# Patient Record
Sex: MALE | Race: WHITE | NOT HISPANIC OR LATINO | Employment: FULL TIME | URBAN - NONMETROPOLITAN AREA
[De-identification: names, ages, dates, MRNs, and addresses within clinical notes are randomized per-mention and may not be internally consistent; named-entity substitution may affect disease eponyms.]

---

## 2017-06-06 ENCOUNTER — TELEPHONE (OUTPATIENT)
Dept: FAMILY MEDICINE CLINIC | Facility: CLINIC | Age: 67
End: 2017-06-06

## 2017-06-12 RX ORDER — TADALAFIL 10 MG/1
10 TABLET ORAL DAILY PRN
Qty: 5 TABLET | Refills: 0 | Status: SHIPPED | OUTPATIENT
Start: 2017-06-12 | End: 2017-07-06 | Stop reason: SDUPTHER

## 2017-07-06 RX ORDER — TADALAFIL 10 MG/1
10 TABLET ORAL DAILY PRN
Qty: 5 TABLET | Refills: 0 | Status: SHIPPED | OUTPATIENT
Start: 2017-07-06 | End: 2018-08-29

## 2017-07-06 NOTE — TELEPHONE ENCOUNTER
Patient did not  Rx at [facility] in June does not use that pharmacy any longer,verified with pharmacy ,resent Cialis to [facility] as requested.

## 2017-07-10 ENCOUNTER — TELEPHONE (OUTPATIENT)
Dept: FAMILY MEDICINE CLINIC | Facility: CLINIC | Age: 67
End: 2017-07-10

## 2017-07-10 DIAGNOSIS — N52.9 ERECTILE DYSFUNCTION, UNSPECIFIED ERECTILE DYSFUNCTION TYPE: Primary | ICD-10-CM

## 2017-07-10 NOTE — TELEPHONE ENCOUNTER
Yes ok to try      After speaking with the provider she deceided to send to urology & patient was notified.

## 2017-07-10 NOTE — TELEPHONE ENCOUNTER
Patient called reports Cialis is too expensive $150.00 for 5 pills but the pharmacist told him that Revatio 20mg would only be $20.00 after his insurance paid & he wants to know if he can have this to try?

## 2017-07-31 ENCOUNTER — OFFICE VISIT (OUTPATIENT)
Dept: UROLOGY | Facility: CLINIC | Age: 67
End: 2017-07-31

## 2017-07-31 DIAGNOSIS — R79.89 LOW TESTOSTERONE: Primary | ICD-10-CM

## 2017-07-31 DIAGNOSIS — N52.02 CORPORO-VENOUS OCCLUSIVE ERECTILE DYSFUNCTION: ICD-10-CM

## 2017-07-31 LAB
BASOPHILS # BLD AUTO: 0.07 10*3/MM3 (ref 0–0.3)
BASOPHILS NFR BLD AUTO: 1.1 % (ref 0–2)
DEPRECATED RDW RBC AUTO: 39.5 FL (ref 37–54)
EOSINOPHIL # BLD AUTO: 0.19 10*3/MM3 (ref 0–0.7)
EOSINOPHIL NFR BLD AUTO: 3 % (ref 0–5)
ERYTHROCYTE [DISTWIDTH] IN BLOOD BY AUTOMATED COUNT: 12.3 % (ref 11.5–14.5)
HCT VFR BLD AUTO: 38.6 % (ref 42–52)
HGB BLD-MCNC: 12.7 G/DL (ref 14–18)
IMM GRANULOCYTES # BLD: 0.01 10*3/MM3 (ref 0–0.03)
IMM GRANULOCYTES NFR BLD: 0.2 % (ref 0–0.5)
LYMPHOCYTES # BLD AUTO: 2.91 10*3/MM3 (ref 1–3)
LYMPHOCYTES NFR BLD AUTO: 46 % (ref 21–51)
MCH RBC QN AUTO: 29.5 PG (ref 27–33)
MCHC RBC AUTO-ENTMCNC: 32.9 G/DL (ref 33–37)
MCV RBC AUTO: 89.8 FL (ref 80–94)
MONOCYTES # BLD AUTO: 0.68 10*3/MM3 (ref 0.1–0.9)
MONOCYTES NFR BLD AUTO: 10.7 % (ref 0–10)
NEUTROPHILS # BLD AUTO: 2.47 10*3/MM3 (ref 1.4–6.5)
NEUTROPHILS NFR BLD AUTO: 39 % (ref 30–70)
PLATELET # BLD AUTO: 233 10*3/MM3 (ref 130–400)
PMV BLD AUTO: 11.1 FL (ref 6–10)
PSA SERPL-MCNC: 0.12 NG/ML (ref 0–4)
RBC # BLD AUTO: 4.3 10*6/MM3 (ref 4.7–6.1)
TESTOST SERPL-MCNC: 172.46 NG/DL (ref 86.98–780.1)
WBC NRBC COR # BLD: 6.33 10*3/MM3 (ref 4.5–12.5)

## 2017-07-31 PROCEDURE — 36415 COLL VENOUS BLD VENIPUNCTURE: CPT | Performed by: UROLOGY

## 2017-07-31 PROCEDURE — 84153 ASSAY OF PSA TOTAL: CPT | Performed by: UROLOGY

## 2017-07-31 PROCEDURE — 99213 OFFICE O/P EST LOW 20 MIN: CPT | Performed by: UROLOGY

## 2017-07-31 PROCEDURE — 96372 THER/PROPH/DIAG INJ SC/IM: CPT | Performed by: UROLOGY

## 2017-07-31 PROCEDURE — 84403 ASSAY OF TOTAL TESTOSTERONE: CPT | Performed by: UROLOGY

## 2017-07-31 PROCEDURE — 85025 COMPLETE CBC W/AUTO DIFF WBC: CPT | Performed by: UROLOGY

## 2017-07-31 RX ORDER — SILDENAFIL CITRATE 20 MG/1
TABLET ORAL
Qty: 24 TABLET | Refills: 11 | Status: SHIPPED | OUTPATIENT
Start: 2017-07-31 | End: 2017-07-31 | Stop reason: SDUPTHER

## 2017-07-31 RX ORDER — SILDENAFIL CITRATE 20 MG/1
TABLET ORAL
Qty: 24 TABLET | Refills: 11 | Status: SHIPPED | OUTPATIENT
Start: 2017-07-31 | End: 2018-08-29

## 2017-07-31 RX ORDER — TESTOSTERONE CYPIONATE 200 MG/ML
400 INJECTION, SOLUTION INTRAMUSCULAR ONCE
Status: COMPLETED | OUTPATIENT
Start: 2017-07-31 | End: 2017-07-31

## 2017-07-31 RX ADMIN — TESTOSTERONE CYPIONATE 400 MG: 200 INJECTION, SOLUTION INTRAMUSCULAR at 15:54

## 2017-07-31 NOTE — PROGRESS NOTES
Chief Complaint:          Chief Complaint   Patient presents with   • Erectile Dysfunction       HPI:   66 y.o. male.    66-year-old white male who carries the diagnosis of mumps orchitis had been on ask her on testosterone replacement therapy since 2013 but he stopped because of the severe reaction.  He also like to Viagra to use on a prn but he couldn't afford the excessive price tag.  He currently has a positive Art questionnaire with fatigue and low libido difficulty achieving and maintaining an erection etc.  His etiology for low testosterone is clearly a consequence of mumps orchitis that he had post pubertal E.  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  I'm going to re-institute therapy.  I'm going to check appropriate labs and start him on injectable therapy after levels today and see him back in 2 weeks teach him how to do it I started him on generic Viagra today.This pleasant male patient presents today with signs and symptoms that are consistent with low testosterone he has positive Art questionnaire by history.  He has a discussion of the various forms testosterone available including parenteral, topical, and the form of a patch.  We discussed the efficacy of the gels, and the injections.  As well as the cost and benefits analysis.  We discussed the the studies a talked about heart disease and its effect on prostate cancer both of which are negligible.  He gives verbal consent to proceed with treatment.  He understands the risks and benefits of length he also completed his attempts at fertility he understands the partial effect on spermatogenesis.ED-we discussed the anatomy and physiology of the penis and the endothelium.  We discussed the various treatment options available including oral medication and its various forms.  Penile injections, vacuum erection device and surgical intervention reserved for only the most severe cases.  We  discussed the need for testosterone and about 20% of cases of erectile dysfunction  Past Medical History:        Past Medical History:   Diagnosis Date   • Arthritis    • Dyslipidemia    • Fatigue    • Hyperlipidemia    • Lower back pain    • Lumbar strain    • Myalgia    • Rib pain on right side    • Rotator cuff injury 1993    bucked of horse         Current Meds:     Current Outpatient Prescriptions   Medication Sig Dispense Refill   • gabapentin (NEURONTIN) 800 MG tablet Take 1 tablet by mouth Daily. 30 tablet 5   • methocarbamol (ROBAXIN) 750 MG tablet Take 1 tablet by mouth 2 (Two) Times a Day. 60 tablet 5   • naproxen (NAPROSYN) 500 MG tablet Take 1 tablet by mouth 2 (Two) Times a Day With Meals. 60 tablet 5   • tadalafil (CIALIS) 10 MG tablet Take 1 tablet by mouth Daily As Needed for erectile dysfunction. 5 tablet 0     No current facility-administered medications for this visit.         Allergies:      Allergies   Allergen Reactions   • Sulfa Antibiotics         Past Surgical History:     No past surgical history on file.      Social History:     Social History     Social History   • Marital status:      Spouse name: N/A   • Number of children: N/A   • Years of education: N/A     Occupational History   •       Social History Main Topics   • Smoking status: Never Smoker   • Smokeless tobacco: Current User     Types: Snuff   • Alcohol use No   • Drug use: Not on file   • Sexual activity: Not on file     Other Topics Concern   • Not on file     Social History Narrative       Family History:     Family History   Problem Relation Age of Onset   • Family history unknown: Yes       Review of Systems:     Review of Systems   Constitutional: Negative.    HENT: Negative.    Eyes: Negative.    Respiratory: Negative.    Cardiovascular: Negative.    Gastrointestinal: Negative.    Endocrine: Negative.    Musculoskeletal: Negative.    Allergic/Immunologic: Negative.    Neurological: Negative.     Hematological: Negative.    Psychiatric/Behavioral: Negative.        Physical Exam:     Physical Exam   Constitutional: He is oriented to person, place, and time. He appears well-developed and well-nourished.   HENT:   Head: Normocephalic and atraumatic.   Eyes: Conjunctivae and EOM are normal. Pupils are equal, round, and reactive to light.   Neck: Normal range of motion.   Cardiovascular: Normal rate, regular rhythm, normal heart sounds and intact distal pulses.    Pulmonary/Chest: Effort normal and breath sounds normal.   Abdominal: Soft. Bowel sounds are normal.   Genitourinary: Rectum normal, prostate normal and penis normal.   Genitourinary Comments: Uncircumcised normal phallus bilaterally descended severely atrophic testis as a consequence of mumps orchitis.  Small smooth firm prostate   Musculoskeletal: Normal range of motion.   Neurological: He is alert and oriented to person, place, and time. He has normal reflexes.   Skin: Skin is warm and dry.   Psychiatric: He has a normal mood and affect. His behavior is normal. Judgment and thought content normal.   Nursing note and vitals reviewed.      Procedure:       Assessment:   No diagnosis found.  No orders of the defined types were placed in this encounter.      Plan:   **Chris as a consequence of mumps orchitis am going to institute therapy today after appropriate laboratory parameters including safety laboratory parameters.This pleasant male patient presents today with signs and symptoms that are consistent with low testosterone he has positive Art questionnaire by history.  He has a discussion of the various forms testosterone available including parenteral, topical, and the form of a patch.  We discussed the efficacy of the gels, and the injections.  As well as the cost and benefits analysis.  We discussed the the studies a talked about heart disease and its effect on prostate cancer both of which are negligible.  He gives verbal consent to proceed  with treatment.  He understands the risks and benefits of length he also completed his attempts at fertility he understands the partial effect on spermatogenesis  Erectile dysfunction I'm going to institute Viagra therapy with generic treatment          This document has been electronically signed by OVIDIO ESTEVEZ MD July 31, 2017 3:07 PM

## 2017-08-21 ENCOUNTER — OFFICE VISIT (OUTPATIENT)
Dept: UROLOGY | Facility: CLINIC | Age: 67
End: 2017-08-21

## 2017-08-21 VITALS
HEIGHT: 71 IN | WEIGHT: 218 LBS | BODY MASS INDEX: 30.52 KG/M2 | HEART RATE: 51 BPM | DIASTOLIC BLOOD PRESSURE: 72 MMHG | SYSTOLIC BLOOD PRESSURE: 120 MMHG

## 2017-08-21 DIAGNOSIS — N52.03 COMBINED ARTERIAL INSUFFICIENCY AND CORPORO-VENOUS OCCLUSIVE ERECTILE DYSFUNCTION: Primary | ICD-10-CM

## 2017-08-21 PROCEDURE — 96372 THER/PROPH/DIAG INJ SC/IM: CPT | Performed by: UROLOGY

## 2017-08-21 PROCEDURE — 99214 OFFICE O/P EST MOD 30 MIN: CPT | Performed by: UROLOGY

## 2017-08-21 RX ORDER — TESTOSTERONE CYPIONATE 200 MG/ML
100 INJECTION, SOLUTION INTRAMUSCULAR ONCE
Status: COMPLETED | OUTPATIENT
Start: 2017-08-21 | End: 2017-08-21

## 2017-08-21 RX ORDER — TESTOSTERONE CYPIONATE 200 MG/ML
INJECTION, SOLUTION INTRAMUSCULAR
Qty: 10 ML | Refills: 2 | Status: SHIPPED | OUTPATIENT
Start: 2017-08-21 | End: 2017-10-16 | Stop reason: SDUPTHER

## 2017-08-21 RX ORDER — SILDENAFIL CITRATE 20 MG/1
TABLET ORAL
Qty: 24 TABLET | Refills: 11 | Status: SHIPPED | OUTPATIENT
Start: 2017-08-21 | End: 2018-08-29

## 2017-08-21 RX ADMIN — TESTOSTERONE CYPIONATE 100 MG: 200 INJECTION, SOLUTION INTRAMUSCULAR at 08:19

## 2017-08-21 NOTE — PROGRESS NOTES
"Chief Complaint:          Chief Complaint   Patient presents with   • Erectile Dysfunction     2 week f/u       HPI:   66 y.o. male.  66-year-old white male with erectile dysfunction.  He received the testosterone shot with dramatic results wants to continue the therapy himself.  His PSA was 0.120.  His testosterone level was low at 172.46.  He is a 20 mg of sildenafil very successfully.  We discussed options and places to fill the Rx.  Currently he is completely free of HARRISON-androgen deficiency in the age male questionnaire  The patient was queried regarding the androgen deficiency in the age male questionnaire.  This is a validated questionnaire that was performed onset of 314 [place] male physicians when it was positive it correlated directly with a 94% chance of low testosterone.  Patient indicates there is a decrease in libido or sex drive, a lack of energy, I decreased his strength and endurance, a decreased \"enjoyment of life\", sad and grumpy feelings with significant difficulty maintaining erections.  He is also been a recent deterioration regarding work performance.    understands that while he is a candidate for topical therapy if he is in contact with children this is not an option because it's been shown to accentuate genitalia development at an early age that this frequently irreversible.  He also understands this is a controlled substance and as such will not be prescribed without appropriate follow-up and appropriate laboratory investigation.  He understands effects on spermatogenesis including the fact that this is not always completely reversible and not always completely limited his ability to father a child.  He has demonstrated facility in the technique of both intramuscular and subcutaneous injection.  And has been taught sterility in drawing up the medication.  ED-we discussed the anatomy and physiology of the penis and the endothelium.  We discussed the various treatment options available " including oral medication and its various forms.  Penile injections, vacuum erection device and surgical intervention reserved for only the most severe cases.  We discussed the need for testosterone and about 20% of cases of erectile dysfunction he was given a prescription for sildenafil as well        Past Medical History:        Past Medical History:   Diagnosis Date   • Arthritis    • Dyslipidemia    • Fatigue    • Hyperlipidemia    • Lower back pain    • Lumbar strain    • Myalgia    • Rib pain on right side    • Rotator cuff injury 1993    bucked of horse         Current Meds:     Current Outpatient Prescriptions   Medication Sig Dispense Refill   • gabapentin (NEURONTIN) 800 MG tablet Take 1 tablet by mouth Daily. 30 tablet 5   • methocarbamol (ROBAXIN) 750 MG tablet Take 1 tablet by mouth 2 (Two) Times a Day. 60 tablet 5   • naproxen (NAPROSYN) 500 MG tablet Take 1 tablet by mouth 2 (Two) Times a Day With Meals. 60 tablet 5   • sildenafil (REVATIO) 20 MG tablet 1-5 by mouth one hour prior to intercourse on an empty stomach 24 tablet 11   • tadalafil (CIALIS) 10 MG tablet Take 1 tablet by mouth Daily As Needed for erectile dysfunction. 5 tablet 0     No current facility-administered medications for this visit.         Allergies:      Allergies   Allergen Reactions   • Sulfa Antibiotics         Past Surgical History:     History reviewed. No pertinent surgical history.      Social History:     Social History     Social History   • Marital status:      Spouse name: N/A   • Number of children: N/A   • Years of education: N/A     Occupational History   •       Social History Main Topics   • Smoking status: Never Smoker   • Smokeless tobacco: Current User     Types: Snuff   • Alcohol use No   • Drug use: Not on file   • Sexual activity: Not on file     Other Topics Concern   • Not on file     Social History Narrative       Family History:     Family History   Problem Relation Age of Onset   •  Family history unknown: Yes       Review of Systems:     Review of Systems   Constitutional: Negative.    HENT: Negative.    Eyes: Negative.    Respiratory: Negative.    Cardiovascular: Negative.    Gastrointestinal: Negative.    Endocrine: Negative.    Musculoskeletal: Negative.    Allergic/Immunologic: Negative.    Neurological: Negative.    Hematological: Negative.    Psychiatric/Behavioral: Negative.        Physical Exam:     Physical Exam   Constitutional: He is oriented to person, place, and time. He appears well-developed and well-nourished.   HENT:   Head: Normocephalic and atraumatic.   Eyes: Conjunctivae and EOM are normal. Pupils are equal, round, and reactive to light.   Neck: Normal range of motion.   Cardiovascular: Normal rate, regular rhythm, normal heart sounds and intact distal pulses.    Pulmonary/Chest: Effort normal and breath sounds normal.   Abdominal: Soft. Bowel sounds are normal.   Genitourinary: Rectum normal, prostate normal and penis normal.   Musculoskeletal: Normal range of motion.   Neurological: He is alert and oriented to person, place, and time. He has normal reflexes.   Skin: Skin is warm and dry.   Psychiatric: He has a normal mood and affect. His behavior is normal. Judgment and thought content normal.   Nursing note and vitals reviewed.      Procedure:       Assessment:   No diagnosis found.  No orders of the defined types were placed in this encounter.      Plan:   Low Testosterone-he will be started on testosterone replacement therapy today.  He was given the injections taught technique of injection using sterile technique.  Follow-up with me in 8 weeks and we'll check appropriate laboratory parameters at that time.\  Erectile dysfunction-ring dramatically better with testosterone incident sildenafil.  I'll follow back up with him in 8 weeks        This document has been electronically signed by OVIDIO ESTEVEZ MD August 21, 2017 8:00 AM

## 2017-10-12 ENCOUNTER — OFFICE VISIT (OUTPATIENT)
Dept: FAMILY MEDICINE CLINIC | Facility: CLINIC | Age: 67
End: 2017-10-12

## 2017-10-12 VITALS
SYSTOLIC BLOOD PRESSURE: 126 MMHG | HEIGHT: 71 IN | OXYGEN SATURATION: 98 % | BODY MASS INDEX: 32.48 KG/M2 | HEART RATE: 57 BPM | DIASTOLIC BLOOD PRESSURE: 79 MMHG | WEIGHT: 232 LBS

## 2017-10-12 DIAGNOSIS — Z23 IMMUNIZATION DUE: ICD-10-CM

## 2017-10-12 DIAGNOSIS — E78.2 MIXED HYPERLIPIDEMIA: Primary | ICD-10-CM

## 2017-10-12 LAB
ALBUMIN SERPL-MCNC: 4.5 G/DL (ref 3.4–4.8)
ALBUMIN/GLOB SERPL: 1.5 G/DL (ref 1.5–2.5)
ALP SERPL-CCNC: 74 U/L (ref 40–129)
ALT SERPL W P-5'-P-CCNC: 21 U/L (ref 10–44)
ANION GAP SERPL CALCULATED.3IONS-SCNC: 5.9 MMOL/L (ref 3.6–11.2)
AST SERPL-CCNC: 33 U/L (ref 10–34)
BILIRUB SERPL-MCNC: 0.7 MG/DL (ref 0.2–1.8)
BUN BLD-MCNC: 22 MG/DL (ref 7–21)
BUN/CREAT SERPL: 22.4 (ref 7–25)
CALCIUM SPEC-SCNC: 10.1 MG/DL (ref 7.7–10)
CHLORIDE SERPL-SCNC: 108 MMOL/L (ref 99–112)
CHOLEST SERPL-MCNC: 188 MG/DL (ref 0–200)
CO2 SERPL-SCNC: 29.1 MMOL/L (ref 24.3–31.9)
CREAT BLD-MCNC: 0.98 MG/DL (ref 0.43–1.29)
GFR SERPL CREATININE-BSD FRML MDRD: 77 ML/MIN/1.73
GLOBULIN UR ELPH-MCNC: 3 GM/DL
GLUCOSE BLD-MCNC: 97 MG/DL (ref 70–110)
HDLC SERPL-MCNC: 72 MG/DL (ref 60–100)
LDLC SERPL CALC-MCNC: 106 MG/DL (ref 0–100)
LDLC/HDLC SERPL: 1.47 {RATIO}
OSMOLALITY SERPL CALC.SUM OF ELEC: 288.2 MOSM/KG (ref 273–305)
POTASSIUM BLD-SCNC: 5.4 MMOL/L (ref 3.5–5.3)
PROT SERPL-MCNC: 7.5 G/DL (ref 6–8)
SODIUM BLD-SCNC: 143 MMOL/L (ref 135–153)
TRIGL SERPL-MCNC: 52 MG/DL (ref 0–150)
VLDLC SERPL-MCNC: 10.4 MG/DL

## 2017-10-12 PROCEDURE — 99213 OFFICE O/P EST LOW 20 MIN: CPT | Performed by: NURSE PRACTITIONER

## 2017-10-12 PROCEDURE — 90686 IIV4 VACC NO PRSV 0.5 ML IM: CPT | Performed by: NURSE PRACTITIONER

## 2017-10-12 PROCEDURE — 80061 LIPID PANEL: CPT | Performed by: NURSE PRACTITIONER

## 2017-10-12 PROCEDURE — 80053 COMPREHEN METABOLIC PANEL: CPT | Performed by: NURSE PRACTITIONER

## 2017-10-12 PROCEDURE — 36415 COLL VENOUS BLD VENIPUNCTURE: CPT | Performed by: NURSE PRACTITIONER

## 2017-10-12 PROCEDURE — 90471 IMMUNIZATION ADMIN: CPT | Performed by: NURSE PRACTITIONER

## 2017-10-12 NOTE — PROGRESS NOTES
Subjective   Ulices Morrissey is a 66 y.o. male.     Hyperlipidemia   This is a chronic problem. The current episode started more than 1 year ago. Recent lipid tests were reviewed and are variable. Exacerbating diseases include obesity. There are no known factors aggravating his hyperlipidemia. Current antihyperlipidemic treatment includes statins. The current treatment provides moderate improvement of lipids. There are no compliance problems.    Osteoarthritis   This is a chronic (intermittent low back pain.  Been well for some time.  Only taking an occassional Naproxen or Ibuprofen ) problem. The current episode started more than 1 year ago. The problem occurs intermittently. The problem has been waxing and waning. Associated symptoms include fatigue (improved with routine exercise). Associated symptoms comments: Lower back pain and aches  Intermittent no symptoms in some time   . He has tried walking for the symptoms. The treatment provided moderate relief.      The following portions of the patient's history were reviewed and updated as appropriate: allergies, current medications, past family history, past medical history, past social history, past surgical history and problem list.      Review of Systems   Constitutional: Positive for fatigue (improved with routine exercise).   HENT: Negative.    Respiratory: Negative.    Cardiovascular: Negative.    Gastrointestinal: Negative.    Genitourinary: Negative.    Musculoskeletal: Back pain: intermittent  no pain in some time    All other systems reviewed and are negative.      Procedures    Objective   Physical Exam   Constitutional: He is oriented to person, place, and time. He appears well-developed and well-nourished. No distress.   HENT:   Head: Normocephalic.   Neck: Neck supple.   Cardiovascular: Normal rate, regular rhythm, normal heart sounds and intact distal pulses.    No murmur heard.  Pulmonary/Chest: Effort normal and breath sounds normal.   Musculoskeletal:  Normal range of motion. He exhibits tenderness (mid lumbar). He exhibits no edema.   Lymphadenopathy:     He has no cervical adenopathy.   Neurological: He is alert and oriented to person, place, and time.   Skin: Skin is warm and dry. He is not diaphoretic.   Psychiatric: He has a normal mood and affect. His behavior is normal.   Nursing note and vitals reviewed.    Assessment/Plan   Discussed with patient impression and plan, patient verbalizes understanding.  Continue with routine medications and exercise.    Ulices was seen today for follow-up, hyperlipidemia and flu vaccine.    Diagnoses and all orders for this visit:    Mixed hyperlipidemia  -     Comprehensive Metabolic Panel  -     Lipid Panel  -     Osmolality, Calculated; Future  -     Osmolality, Calculated    Immunization due    Other orders  -     Flu Vaccine Quad PF 3YR+ (FLUARIX/FLUZONE [id number])

## 2017-10-16 ENCOUNTER — OFFICE VISIT (OUTPATIENT)
Dept: UROLOGY | Facility: CLINIC | Age: 67
End: 2017-10-16

## 2017-10-16 DIAGNOSIS — N52.03 COMBINED ARTERIAL INSUFFICIENCY AND CORPORO-VENOUS OCCLUSIVE ERECTILE DYSFUNCTION: ICD-10-CM

## 2017-10-16 DIAGNOSIS — E29.1 HYPOGONADISM IN MALE: Primary | ICD-10-CM

## 2017-10-16 DIAGNOSIS — R79.89 LOW TESTOSTERONE: ICD-10-CM

## 2017-10-16 LAB
BASOPHILS # BLD AUTO: 0.05 10*3/MM3 (ref 0–0.3)
BASOPHILS NFR BLD AUTO: 0.6 % (ref 0–2)
DEPRECATED RDW RBC AUTO: 41.7 FL (ref 37–54)
EOSINOPHIL # BLD AUTO: 0.19 10*3/MM3 (ref 0–0.7)
EOSINOPHIL NFR BLD AUTO: 2.3 % (ref 0–5)
ERYTHROCYTE [DISTWIDTH] IN BLOOD BY AUTOMATED COUNT: 12.8 % (ref 11.5–14.5)
HCT VFR BLD AUTO: 42.6 % (ref 42–52)
HGB BLD-MCNC: 13.9 G/DL (ref 14–18)
IMM GRANULOCYTES # BLD: 0.02 10*3/MM3 (ref 0–0.03)
IMM GRANULOCYTES NFR BLD: 0.2 % (ref 0–0.5)
LYMPHOCYTES # BLD AUTO: 2.49 10*3/MM3 (ref 1–3)
LYMPHOCYTES NFR BLD AUTO: 29.7 % (ref 21–51)
MCH RBC QN AUTO: 29.9 PG (ref 27–33)
MCHC RBC AUTO-ENTMCNC: 32.6 G/DL (ref 33–37)
MCV RBC AUTO: 91.6 FL (ref 80–94)
MONOCYTES # BLD AUTO: 1.03 10*3/MM3 (ref 0.1–0.9)
MONOCYTES NFR BLD AUTO: 12.3 % (ref 0–10)
NEUTROPHILS # BLD AUTO: 4.6 10*3/MM3 (ref 1.4–6.5)
NEUTROPHILS NFR BLD AUTO: 54.9 % (ref 30–70)
PLATELET # BLD AUTO: 244 10*3/MM3 (ref 130–400)
PMV BLD AUTO: 11.7 FL (ref 6–10)
PSA SERPL-MCNC: 0.45 NG/ML (ref 0–4)
RBC # BLD AUTO: 4.65 10*6/MM3 (ref 4.7–6.1)
TESTOST SERPL-MCNC: >1500 NG/DL (ref 86.98–780.1)
WBC NRBC COR # BLD: 8.38 10*3/MM3 (ref 4.5–12.5)

## 2017-10-16 PROCEDURE — 99213 OFFICE O/P EST LOW 20 MIN: CPT | Performed by: UROLOGY

## 2017-10-16 PROCEDURE — 84153 ASSAY OF PSA TOTAL: CPT | Performed by: UROLOGY

## 2017-10-16 PROCEDURE — 84403 ASSAY OF TOTAL TESTOSTERONE: CPT | Performed by: UROLOGY

## 2017-10-16 PROCEDURE — 85025 COMPLETE CBC W/AUTO DIFF WBC: CPT | Performed by: UROLOGY

## 2017-10-16 PROCEDURE — 36415 COLL VENOUS BLD VENIPUNCTURE: CPT | Performed by: UROLOGY

## 2017-10-16 RX ORDER — TESTOSTERONE CYPIONATE 200 MG/ML
INJECTION, SOLUTION INTRAMUSCULAR
Qty: 10 ML | Refills: 2 | Status: SHIPPED | OUTPATIENT
Start: 2017-10-16 | End: 2018-08-29 | Stop reason: SDUPTHER

## 2017-10-16 NOTE — PROGRESS NOTES
Chief Complaint:          Chief Complaint   Patient presents with   • Hypogonadism       HPI:   66 y.o. male.  -Low testosterone: patient is here for follow-up.  Since beginning the medication he's been very pleased.  He reports a dramatic improvement in his erections, ability to achieve and maintain an erection, improvement in libido, increase in frequency of morning erections, a noticeable weight loss consistent with the treatment.  No development of breast problems or abnormalities.  He's going to have appropriate safety laboratory parameters checked.   He understands that the new data implicates testosterone with the development of prostate cancer and this is all but been disproven and the medical literature as well as the risks of cardiovascular disease which is actually also been disproven.  He understands that while he is a candidate for topical therapy if he is in contact with children this is not an option because it's been shown to accentuate genitalia development at an early age that this frequently irreversible.  He also understands this is a controlled substance and as such will not be prescribed without appropriate follow-up and appropriate laboratory investigation.  He understands effects on spermatogenesis including the fact that this is not always completely reversible and not always completely limited his ability to father a child.  He has demonstrated facility in the technique of both intramuscular and subcutaneous injection.  And has been taught sterility one drawing up the medication.        Past Medical History:        Past Medical History:   Diagnosis Date   • Arthritis    • Dyslipidemia    • Fatigue    • Hyperlipidemia    • Lower back pain    • Lumbar strain    • Myalgia    • Rib pain on right side    • Rotator cuff injury 1993    bucked of horse         Current Meds:     Current Outpatient Prescriptions   Medication Sig Dispense Refill   • gabapentin (NEURONTIN) 800 MG tablet Take 1 tablet  by mouth Daily. 30 tablet 5   • methocarbamol (ROBAXIN) 750 MG tablet Take 1 tablet by mouth 2 (Two) Times a Day. 60 tablet 5   • naproxen (NAPROSYN) 500 MG tablet Take 1 tablet by mouth 2 (Two) Times a Day With Meals. 60 tablet 5   • sildenafil (REVATIO) 20 MG tablet 1-5 by mouth one hour prior to intercourse on an empty stomach 24 tablet 11   • sildenafil (REVATIO) 20 MG tablet 1-5 by mouth one hour prior to intercourse on an empty stomach 24 tablet 11   • Syringe, Disposable, 3 ML misc Use 3 ml syringe with a 25 gauge  5/8 inch needle 24 each 6   • tadalafil (CIALIS) 10 MG tablet Take 1 tablet by mouth Daily As Needed for erectile dysfunction. 5 tablet 0   • Testosterone Cypionate (DEPO-TESTOSTERONE) 200 MG/ML injection He is to use 1/2 cc every Monday and Thursday SQ 10 mL 2     No current facility-administered medications for this visit.         Allergies:      Allergies   Allergen Reactions   • Sulfa Antibiotics         Past Surgical History:     No past surgical history on file.      Social History:     Social History     Social History   • Marital status:      Spouse name: N/A   • Number of children: N/A   • Years of education: N/A     Occupational History   •       Social History Main Topics   • Smoking status: Never Smoker   • Smokeless tobacco: Current User     Types: Snuff   • Alcohol use No   • Drug use: No   • Sexual activity: Defer     Other Topics Concern   • Not on file     Social History Narrative       Family History:     Family History   Problem Relation Age of Onset   • Family history unknown: Yes       Review of Systems:     Review of Systems   Constitutional: Negative.    HENT: Negative.    Eyes: Negative.    Respiratory: Negative.    Cardiovascular: Negative.    Gastrointestinal: Negative.    Endocrine: Negative.    Musculoskeletal: Negative.    Allergic/Immunologic: Negative.    Neurological: Negative.    Hematological: Negative.    Psychiatric/Behavioral: Negative.         Physical Exam:     Physical Exam   Constitutional: He is oriented to person, place, and time. He appears well-developed and well-nourished.   HENT:   Head: Normocephalic and atraumatic.   Eyes: Conjunctivae and EOM are normal. Pupils are equal, round, and reactive to light.   Neck: Normal range of motion.   Cardiovascular: Normal rate, regular rhythm, normal heart sounds and intact distal pulses.    Pulmonary/Chest: Effort normal and breath sounds normal.   Abdominal: Soft. Bowel sounds are normal.   Genitourinary: Rectum normal, prostate normal and penis normal.   Musculoskeletal: Normal range of motion.   Neurological: He is alert and oriented to person, place, and time. He has normal reflexes.   Skin: Skin is warm and dry.   Psychiatric: He has a normal mood and affect. His behavior is normal. Judgment and thought content normal.   Nursing note and vitals reviewed.      Procedure:       Assessment:   No diagnosis found.  No orders of the defined types were placed in this encounter.      Plan:   -Low testosterone: patient is here for follow-up.  Since beginning the medication he's been very pleased.  He reports a dramatic improvement in his erections, ability to achieve and maintain an erection, improvement in libido, increase in frequency of morning erections, a noticeable weight loss consistent with the treatment.  No development of breast problems or abnormalities.  He's going to have appropriate safety laboratory parameters checked.   He understands that the new data implicates testosterone with the development of prostate cancer and this is all but been disproven and the medical literature as well as the risks of cardiovascular disease which is actually also been disproven.  He understands that while he is a candidate for topical therapy if he is in contact with children this is not an option because it's been shown to accentuate genitalia development at an early age that this frequently irreversible.   He also understands this is a controlled substance and as such will not be prescribed without appropriate follow-up and appropriate laboratory investigation.  He understands effects on spermatogenesis including the fact that this is not always completely reversible and not always completely limited his ability to father a child.  He has demonstrated facility in the technique of both intramuscular and subcutaneous injection.  And has been taught sterility one drawing up the medication.  He felt the medication he's getting greater erections and ejaculations.  He's lost weight is increased muscle mass see him back in 6 months he is appropriate labs pending           This document has been electronically signed by OVIDIO ESTEVEZ MD October 16, 2017 3:49 PM

## 2017-11-01 RX ORDER — NAPROXEN 500 MG/1
TABLET ORAL
Qty: 60 TABLET | Refills: 0 | Status: SHIPPED | OUTPATIENT
Start: 2017-11-01 | End: 2018-03-16 | Stop reason: SDUPTHER

## 2017-11-27 ENCOUNTER — OFFICE VISIT (OUTPATIENT)
Dept: UROLOGY | Facility: CLINIC | Age: 67
End: 2017-11-27

## 2017-11-27 DIAGNOSIS — N62 GYNECOMASTIA, MALE: ICD-10-CM

## 2017-11-27 DIAGNOSIS — R79.89 LOW TESTOSTERONE: ICD-10-CM

## 2017-11-27 DIAGNOSIS — R79.89 LOW TESTOSTERONE IN MALE: Primary | ICD-10-CM

## 2017-11-27 PROCEDURE — 99214 OFFICE O/P EST MOD 30 MIN: CPT | Performed by: UROLOGY

## 2017-11-27 RX ORDER — ANASTROZOLE 1 MG/1
1 TABLET ORAL WEEKLY
Qty: 6 TABLET | Refills: 0 | Status: SHIPPED | OUTPATIENT
Start: 2017-11-27 | End: 2018-04-16 | Stop reason: SDUPTHER

## 2017-11-27 NOTE — PROGRESS NOTES
Chief Complaint:          Chief Complaint   Patient presents with   • Hypogonadism       HPI:   66 y.o. male.  66-year-old white male whose thrilled with his testosterone treatment is getting small bumps under the skin this is a technical problem I  instructed him in the proper dosing of a subcutaneous medication and he was pleased.  He's lost a substantial amount await he looks great he feels great he's got significant gynecomastia I'm going to initiate therapy with Arimidex and see back in 6 weeks to check appropriate labs overall an extremely happy.        Past Medical History:        Past Medical History:   Diagnosis Date   • Arthritis    • Dyslipidemia    • Fatigue    • Hyperlipidemia    • Lower back pain    • Lumbar strain    • Myalgia    • Rib pain on right side    • Rotator cuff injury 1993    bucked of horse         Current Meds:     Current Outpatient Prescriptions   Medication Sig Dispense Refill   • gabapentin (NEURONTIN) 800 MG tablet Take 1 tablet by mouth Daily. 30 tablet 5   • methocarbamol (ROBAXIN) 750 MG tablet Take 1 tablet by mouth 2 (Two) Times a Day. 60 tablet 5   • naproxen (NAPROSYN) 500 MG tablet TAKE 1 TABLET BY MOUTH TWICE DAILY WITH MEALS 60 tablet 0   • sildenafil (REVATIO) 20 MG tablet 1-5 by mouth one hour prior to intercourse on an empty stomach 24 tablet 11   • sildenafil (REVATIO) 20 MG tablet 1-5 by mouth one hour prior to intercourse on an empty stomach 24 tablet 11   • Syringe, Disposable, 3 ML misc Use 3 ml syringe with a 25 gauge  5/8 inch needle 24 each 6   • Syringe, Disposable, 3 ML misc Use 3 ml syringe with a 25 gauge  5/8 inch needle 24 each 6   • tadalafil (CIALIS) 10 MG tablet Take 1 tablet by mouth Daily As Needed for erectile dysfunction. 5 tablet 0   • Testosterone Cypionate (DEPO-TESTOSTERONE) 200 MG/ML injection He is to use 1/2 cc every Monday and Thursday SQ 10 mL 2     No current facility-administered medications for this visit.         Allergies:       Allergies   Allergen Reactions   • Sulfa Antibiotics         Past Surgical History:     No past surgical history on file.      Social History:     Social History     Social History   • Marital status:      Spouse name: N/A   • Number of children: N/A   • Years of education: N/A     Occupational History   •       Social History Main Topics   • Smoking status: Never Smoker   • Smokeless tobacco: Current User     Types: Snuff   • Alcohol use No   • Drug use: No   • Sexual activity: Defer     Other Topics Concern   • Not on file     Social History Narrative       Family History:     Family History   Problem Relation Age of Onset   • Family history unknown: Yes       Review of Systems:     Review of Systems   Constitutional: Negative.    HENT: Negative.    Eyes: Negative.    Respiratory: Negative.    Cardiovascular: Negative.    Gastrointestinal: Negative.    Endocrine: Negative.    Musculoskeletal: Negative.    Allergic/Immunologic: Negative.    Neurological: Negative.    Hematological: Negative.    Psychiatric/Behavioral: Negative.        Physical Exam:     Physical Exam   Constitutional: He is oriented to person, place, and time. He appears well-developed and well-nourished.   HENT:   Head: Normocephalic and atraumatic.   Eyes: Conjunctivae and EOM are normal. Pupils are equal, round, and reactive to light.   Neck: Normal range of motion.   Cardiovascular: Normal rate, regular rhythm, normal heart sounds and intact distal pulses.    Pulmonary/Chest: Effort normal and breath sounds normal.   Abdominal: Soft. Bowel sounds are normal.   Genitourinary:   Genitourinary Comments: Small subcutaneous nodule at the site of his injection.  No evidence of infection.  No erythema, rigidity, or tenderness.  No other abnormalities.  This is clearly technical he has significant gynecomastia of a pseudo-nature   Musculoskeletal: Normal range of motion.   Neurological: He is alert and oriented to person, place,  and time. He has normal reflexes.   Skin: Skin is warm and dry.   Psychiatric: He has a normal mood and affect. His behavior is normal. Judgment and thought content normal.   Nursing note and vitals reviewed.      Procedure:       Assessment:   No diagnosis found.  No orders of the defined types were placed in this encounter.      Plan:   -Low testosterone: patient is here for follow-up.  Since beginning the medication he's been very pleased.  He reports a dramatic improvement in his erections, ability to achieve and maintain an erection, improvement in libido, increase in frequency of morning erections, a noticeable weight loss consistent with the treatment.  No development of breast problems or abnormalities.  He's going to have appropriate safety laboratory parameters checked.   He understands that the new data implicates testosterone with the development of prostate cancer and this is all but been disproven and the medical literature as well as the risks of cardiovascular disease which is actually also been disproven.  He understands that while he is a candidate for topical therapy if he is in contact with children this is not an option because it's been shown to accentuate genitalia development at an early age that this frequently irreversible.  He also understands this is a controlled substance and as such will not be prescribed without appropriate follow-up and appropriate laboratory investigation.  He understands effects on spermatogenesis including the fact that this is not always completely reversible and not always completely limited his ability to father a child.  He has demonstrated facility in the technique of both intramuscular and subcutaneous injection.  And has been taught sterility one drawing up the medication.  Pseudogynecomastia-initiate therapy with arimidex.             This document has been electronically signed by OVIDIO ESTEVEZ MD November 27, 2017 3:35 PM

## 2017-11-28 PROBLEM — N62 GYNECOMASTIA, MALE: Status: ACTIVE | Noted: 2017-11-28

## 2017-11-30 ENCOUNTER — TELEPHONE (OUTPATIENT)
Dept: FAMILY MEDICINE CLINIC | Facility: CLINIC | Age: 67
End: 2017-11-30

## 2017-11-30 RX ORDER — AZITHROMYCIN 250 MG/1
TABLET, FILM COATED ORAL
Qty: 6 TABLET | Refills: 0 | Status: SHIPPED | OUTPATIENT
Start: 2017-11-30 | End: 2018-08-29

## 2017-11-30 NOTE — TELEPHONE ENCOUNTER
Requesting a prescription for z-pac or something called in to [facility] in [place] for congestion.  j

## 2017-11-30 NOTE — TELEPHONE ENCOUNTER
Requesting a prescription for z-pac or something called in to [facility] in [place] for congestion.

## 2018-03-16 RX ORDER — NAPROXEN 500 MG/1
TABLET ORAL
Qty: 60 TABLET | Refills: 0 | Status: SHIPPED | OUTPATIENT
Start: 2018-03-16 | End: 2018-08-29 | Stop reason: SDUPTHER

## 2018-04-16 ENCOUNTER — OFFICE VISIT (OUTPATIENT)
Dept: UROLOGY | Facility: CLINIC | Age: 68
End: 2018-04-16

## 2018-04-16 VITALS — HEIGHT: 71 IN | WEIGHT: 231.48 LBS | BODY MASS INDEX: 32.41 KG/M2

## 2018-04-16 DIAGNOSIS — E29.1 HYPOGONADISM IN MALE: Primary | ICD-10-CM

## 2018-04-16 DIAGNOSIS — R79.89 LOW TESTOSTERONE: ICD-10-CM

## 2018-04-16 DIAGNOSIS — R79.89 LOW TESTOSTERONE IN MALE: ICD-10-CM

## 2018-04-16 LAB
BASOPHILS # BLD AUTO: 0.07 10*3/MM3 (ref 0–0.3)
BASOPHILS NFR BLD AUTO: 0.9 % (ref 0–2)
DEPRECATED RDW RBC AUTO: 40.6 FL (ref 37–54)
EOSINOPHIL # BLD AUTO: 0.27 10*3/MM3 (ref 0–0.7)
EOSINOPHIL NFR BLD AUTO: 3.3 % (ref 0–7)
ERYTHROCYTE [DISTWIDTH] IN BLOOD BY AUTOMATED COUNT: 12.3 % (ref 11.5–14.5)
HCT VFR BLD AUTO: 44.6 % (ref 42–52)
HGB BLD-MCNC: 14.6 G/DL (ref 14–18)
IMM GRANULOCYTES # BLD: 0.03 10*3/MM3 (ref 0–0.03)
IMM GRANULOCYTES NFR BLD: 0.4 % (ref 0–0.5)
LYMPHOCYTES # BLD AUTO: 2.55 10*3/MM3 (ref 1–3)
LYMPHOCYTES NFR BLD AUTO: 31.6 % (ref 16–46)
MCH RBC QN AUTO: 30.3 PG (ref 27–33)
MCHC RBC AUTO-ENTMCNC: 32.7 G/DL (ref 33–37)
MCV RBC AUTO: 92.5 FL (ref 80–94)
MONOCYTES # BLD AUTO: 0.84 10*3/MM3 (ref 0.1–0.9)
MONOCYTES NFR BLD AUTO: 10.4 % (ref 0–12)
NEUTROPHILS # BLD AUTO: 4.32 10*3/MM3 (ref 1.4–6.5)
NEUTROPHILS NFR BLD AUTO: 53.4 % (ref 40–75)
PLATELET # BLD AUTO: 219 10*3/MM3 (ref 130–400)
PMV BLD AUTO: 11.3 FL (ref 6–10)
PSA SERPL-MCNC: 0.46 NG/ML (ref 0–4)
RBC # BLD AUTO: 4.82 10*6/MM3 (ref 4.7–6.1)
TESTOST SERPL-MCNC: >1500 NG/DL (ref 86.98–780.1)
WBC NRBC COR # BLD: 8.08 10*3/MM3 (ref 4.5–12.5)

## 2018-04-16 PROCEDURE — 36415 COLL VENOUS BLD VENIPUNCTURE: CPT | Performed by: UROLOGY

## 2018-04-16 PROCEDURE — 84403 ASSAY OF TOTAL TESTOSTERONE: CPT | Performed by: UROLOGY

## 2018-04-16 PROCEDURE — 99214 OFFICE O/P EST MOD 30 MIN: CPT | Performed by: UROLOGY

## 2018-04-16 PROCEDURE — 84153 ASSAY OF PSA TOTAL: CPT | Performed by: UROLOGY

## 2018-04-16 PROCEDURE — 85025 COMPLETE CBC W/AUTO DIFF WBC: CPT | Performed by: UROLOGY

## 2018-04-16 RX ORDER — TESTOSTERONE CYPIONATE 200 MG/ML
INJECTION, SOLUTION INTRAMUSCULAR
Qty: 10 ML | Refills: 2 | Status: SHIPPED | OUTPATIENT
Start: 2018-04-16 | End: 2018-10-15 | Stop reason: SDUPTHER

## 2018-04-16 RX ORDER — ANASTROZOLE 1 MG/1
1 TABLET ORAL WEEKLY
Qty: 12 TABLET | Refills: 2 | Status: SHIPPED | OUTPATIENT
Start: 2018-04-16 | End: 2019-10-17 | Stop reason: SDUPTHER

## 2018-04-16 NOTE — PROGRESS NOTES
"Chief Complaint:          Chief Complaint   Patient presents with   • Hypogonadism     6 MTH FOLLOW UP       HPI:   67 y.o. male.  67-year-old white male on testosterone replacement therapy he's thrilled he feels great he's lost 18 pounds this sexual function has dramatically improved he wants to continue it. Patient returns today for follow-up.  He is been on testosterone replacement therapy.  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  This is a validated questionnaire that was performed on a set of 314 [place] male physicians when it was positive it correlated directly with a 94% chance of low testosterone.  Patient indicates there is a decrease in libido or sex drive, a lack of energy, Decreased  strength and endurance, a decreased \"enjoyment of life\", sad and grumpy feelings with significant difficulty maintaining erections.  He is also been a recent deterioration regarding work performance.  He reports weight loss.  He has good facility and the use of subcutaneous and intramuscular injections as well as comfort level and using the medication in a sterile fashion.  He understands he should use only the prescribed dose.  He's here for appropriate lab monitoring regarding this.  He understands this is a controlled substance and therefore must be watched closely will not be refilled and the medical loss or miss calculation of the dose.  He is very happy with the treatment and therefore wants to continue it.    Past Medical History:        Past Medical History:   Diagnosis Date   • Arthritis    • Dyslipidemia    • Fatigue    • Hyperlipidemia    • Lower back pain    • Lumbar strain    • Myalgia    • Rib pain on right side    • Rotator cuff injury 1993    bucked of horse         Current Meds:     Current Outpatient Prescriptions   Medication Sig Dispense " Refill   • anastrozole (ARIMIDEX) 1 MG tablet Take 1 tablet by mouth 1 (One) Time Per Week. 6 tablet 0   • azithromycin (ZITHROMAX) 250 MG tablet Take 2 tablets the first day, then 1 tablet daily for 4 days. 6 tablet 0   • gabapentin (NEURONTIN) 800 MG tablet Take 1 tablet by mouth Daily. 30 tablet 5   • methocarbamol (ROBAXIN) 750 MG tablet Take 1 tablet by mouth 2 (Two) Times a Day. 60 tablet 5   • naproxen (NAPROSYN) 500 MG tablet TAKE 1 TABLET BY MOUTH TWICE DAILY WITH MEALS 60 tablet 0   • sildenafil (REVATIO) 20 MG tablet 1-5 by mouth one hour prior to intercourse on an empty stomach 24 tablet 11   • sildenafil (REVATIO) 20 MG tablet 1-5 by mouth one hour prior to intercourse on an empty stomach 24 tablet 11   • Syringe, Disposable, 3 ML misc Use 3 ml syringe with a 25 gauge  5/8 inch needle 24 each 6   • Syringe, Disposable, 3 ML misc Use 3 ml syringe with a 25 gauge  5/8 inch needle 24 each 6   • tadalafil (CIALIS) 10 MG tablet Take 1 tablet by mouth Daily As Needed for erectile dysfunction. 5 tablet 0   • Testosterone Cypionate (DEPO-TESTOSTERONE) 200 MG/ML injection He is to use 1/2 cc every Monday and Thursday SQ 10 mL 2     No current facility-administered medications for this visit.         Allergies:      Allergies   Allergen Reactions   • Sulfa Antibiotics         Past Surgical History:     No past surgical history on file.      Social History:     Social History     Social History   • Marital status:      Spouse name: N/A   • Number of children: N/A   • Years of education: N/A     Occupational History   •       Social History Main Topics   • Smoking status: Never Smoker   • Smokeless tobacco: Current User     Types: Snuff   • Alcohol use No   • Drug use: No   • Sexual activity: Defer     Other Topics Concern   • Not on file     Social History Narrative   • No narrative on file       Family History:     Family History   Problem Relation Age of Onset   • Family history unknown: Yes        Review of Systems:     Review of Systems   Constitutional: Negative.    HENT: Negative.    Eyes: Negative.    Respiratory: Negative.    Cardiovascular: Negative.    Gastrointestinal: Negative.    Endocrine: Negative.    Musculoskeletal: Negative.    Allergic/Immunologic: Negative.    Neurological: Negative.    Hematological: Negative.    Psychiatric/Behavioral: Negative.        Physical Exam:     Physical Exam   Constitutional: He is oriented to person, place, and time. He appears well-developed and well-nourished.   HENT:   Head: Normocephalic and atraumatic.   Eyes: Conjunctivae and EOM are normal. Pupils are equal, round, and reactive to light.   Neck: Normal range of motion.   Cardiovascular: Normal rate, regular rhythm, normal heart sounds and intact distal pulses.    Pulmonary/Chest: Effort normal and breath sounds normal.   Abdominal: Soft. Bowel sounds are normal.   Genitourinary: Rectum normal, prostate normal and penis normal.   Musculoskeletal: Normal range of motion.   Neurological: He is alert and oriented to person, place, and time. He has normal reflexes.   Skin: Skin is warm and dry.   Psychiatric: He has a normal mood and affect. His behavior is normal. Judgment and thought content normal.   Nursing note and vitals reviewed.      I have reviewed the following portions of the patient's history: allergies, current medications, past family history, past medical history, past social history, past surgical history, problem list and ROS and confirm it's accurate.      Procedure:       Assessment/Plan:   -Low testosterone: patient is here for follow-up.  Since beginning the medication he's been very pleased.  He reports a dramatic improvement in his erections, ability to achieve and maintain an erection, improvement in libido, increase in frequency of morning erections, a noticeable weight loss consistent with the treatment.  No development of breast problems or abnormalities.  He's going to have  appropriate safety laboratory parameters checked.   He understands that the new data implicates testosterone with the development of prostate cancer and this is all but been disproven and the medical literature as well as the risks of cardiovascular disease which is actually also been disproven.  He understands that while he is a candidate for topical therapy if he is in contact with children this is not an option because it's been shown to accentuate genitalia development at an early age that this frequently irreversible.  He also understands this is a controlled substance and as such will not be prescribed without appropriate follow-up and appropriate laboratory investigation.  He understands effects on spermatogenesis including the fact that this is not always completely reversible and not always completely limited his ability to father a child.  He has demonstrated facility in the technique of both intramuscular and subcutaneous injection.  And has been taught sterility one drawing up the medication.     Patient's Body mass index is 32.3 kg/m². BMI is above normal parameters. Follow-up plan includes:  educational material.          This document has been electronically signed by OVIDIO ESTEVEZ MD April 16, 2018 3:43 PM

## 2018-08-14 DIAGNOSIS — N52.9 ERECTILE DYSFUNCTION, UNSPECIFIED ERECTILE DYSFUNCTION TYPE: Primary | ICD-10-CM

## 2018-08-14 RX ORDER — SILDENAFIL CITRATE 20 MG/1
TABLET ORAL
Qty: 24 TABLET | Refills: 10 | Status: SHIPPED | OUTPATIENT
Start: 2018-08-14 | End: 2020-04-20 | Stop reason: SDUPTHER

## 2018-08-29 ENCOUNTER — OFFICE VISIT (OUTPATIENT)
Dept: FAMILY MEDICINE CLINIC | Facility: CLINIC | Age: 68
End: 2018-08-29

## 2018-08-29 VITALS
DIASTOLIC BLOOD PRESSURE: 74 MMHG | SYSTOLIC BLOOD PRESSURE: 145 MMHG | HEIGHT: 71 IN | BODY MASS INDEX: 28.78 KG/M2 | WEIGHT: 205.6 LBS | HEART RATE: 76 BPM | OXYGEN SATURATION: 98 %

## 2018-08-29 DIAGNOSIS — R10.11 RUQ PAIN: Primary | ICD-10-CM

## 2018-08-29 DIAGNOSIS — J30.2 ACUTE SEASONAL ALLERGIC RHINITIS, UNSPECIFIED TRIGGER: ICD-10-CM

## 2018-08-29 LAB
ALBUMIN SERPL-MCNC: 4.7 G/DL (ref 3.4–4.8)
ALBUMIN/GLOB SERPL: 1.5 G/DL (ref 1.5–2.5)
ALP SERPL-CCNC: 72 U/L (ref 40–129)
ALT SERPL W P-5'-P-CCNC: 24 U/L (ref 10–44)
AMYLASE SERPL-CCNC: 46 U/L (ref 28–100)
ANION GAP SERPL CALCULATED.3IONS-SCNC: 6.9 MMOL/L (ref 3.6–11.2)
AST SERPL-CCNC: 26 U/L (ref 10–34)
BASOPHILS # BLD AUTO: 0.06 10*3/MM3 (ref 0–0.3)
BASOPHILS NFR BLD AUTO: 0.8 % (ref 0–2)
BILIRUB SERPL-MCNC: 0.5 MG/DL (ref 0.2–1.8)
BUN BLD-MCNC: 22 MG/DL (ref 7–21)
BUN/CREAT SERPL: 19 (ref 7–25)
CALCIUM SPEC-SCNC: 9.9 MG/DL (ref 7.7–10)
CHLORIDE SERPL-SCNC: 105 MMOL/L (ref 99–112)
CHOLEST SERPL-MCNC: 146 MG/DL (ref 0–200)
CO2 SERPL-SCNC: 29.1 MMOL/L (ref 24.3–31.9)
CREAT BLD-MCNC: 1.16 MG/DL (ref 0.43–1.29)
DEPRECATED RDW RBC AUTO: 41 FL (ref 37–54)
EOSINOPHIL # BLD AUTO: 0.15 10*3/MM3 (ref 0–0.7)
EOSINOPHIL NFR BLD AUTO: 1.9 % (ref 0–7)
ERYTHROCYTE [DISTWIDTH] IN BLOOD BY AUTOMATED COUNT: 12.3 % (ref 11.5–14.5)
GFR SERPL CREATININE-BSD FRML MDRD: 63 ML/MIN/1.73
GLOBULIN UR ELPH-MCNC: 3.2 GM/DL
GLUCOSE BLD-MCNC: 92 MG/DL (ref 70–110)
HCT VFR BLD AUTO: 46.9 % (ref 42–52)
HDLC SERPL-MCNC: 73 MG/DL (ref 60–100)
HGB BLD-MCNC: 15.6 G/DL (ref 14–18)
IMM GRANULOCYTES # BLD: 0.04 10*3/MM3 (ref 0–0.03)
IMM GRANULOCYTES NFR BLD: 0.5 % (ref 0–0.5)
LDLC SERPL CALC-MCNC: 63 MG/DL (ref 0–100)
LDLC/HDLC SERPL: 0.87 {RATIO}
LIPASE SERPL-CCNC: 37 U/L (ref 13–60)
LYMPHOCYTES # BLD AUTO: 1.61 10*3/MM3 (ref 1–3)
LYMPHOCYTES NFR BLD AUTO: 20.3 % (ref 16–46)
MCH RBC QN AUTO: 31.1 PG (ref 27–33)
MCHC RBC AUTO-ENTMCNC: 33.3 G/DL (ref 33–37)
MCV RBC AUTO: 93.4 FL (ref 80–94)
MONOCYTES # BLD AUTO: 1.06 10*3/MM3 (ref 0.1–0.9)
MONOCYTES NFR BLD AUTO: 13.4 % (ref 0–12)
NEUTROPHILS # BLD AUTO: 5 10*3/MM3 (ref 1.4–6.5)
NEUTROPHILS NFR BLD AUTO: 63.1 % (ref 40–75)
OSMOLALITY SERPL CALC.SUM OF ELEC: 284.2 MOSM/KG (ref 273–305)
PLATELET # BLD AUTO: 198 10*3/MM3 (ref 130–400)
PMV BLD AUTO: 11.7 FL (ref 6–10)
POTASSIUM BLD-SCNC: 5.1 MMOL/L (ref 3.5–5.3)
PROT SERPL-MCNC: 7.9 G/DL (ref 6–8)
RBC # BLD AUTO: 5.02 10*6/MM3 (ref 4.7–6.1)
SODIUM BLD-SCNC: 141 MMOL/L (ref 135–153)
TRIGL SERPL-MCNC: 49 MG/DL (ref 0–150)
VLDLC SERPL-MCNC: 9.8 MG/DL
WBC NRBC COR # BLD: 7.92 10*3/MM3 (ref 4.5–12.5)

## 2018-08-29 PROCEDURE — 80061 LIPID PANEL: CPT | Performed by: NURSE PRACTITIONER

## 2018-08-29 PROCEDURE — 99214 OFFICE O/P EST MOD 30 MIN: CPT | Performed by: NURSE PRACTITIONER

## 2018-08-29 PROCEDURE — 85025 COMPLETE CBC W/AUTO DIFF WBC: CPT | Performed by: NURSE PRACTITIONER

## 2018-08-29 PROCEDURE — 82150 ASSAY OF AMYLASE: CPT | Performed by: NURSE PRACTITIONER

## 2018-08-29 PROCEDURE — 80053 COMPREHEN METABOLIC PANEL: CPT | Performed by: NURSE PRACTITIONER

## 2018-08-29 PROCEDURE — 36415 COLL VENOUS BLD VENIPUNCTURE: CPT | Performed by: NURSE PRACTITIONER

## 2018-08-29 PROCEDURE — 83690 ASSAY OF LIPASE: CPT | Performed by: NURSE PRACTITIONER

## 2018-08-29 RX ORDER — NAPROXEN 500 MG/1
500 TABLET ORAL 2 TIMES DAILY WITH MEALS
Qty: 60 TABLET | Refills: 0 | Status: SHIPPED | OUTPATIENT
Start: 2018-08-29 | End: 2021-01-14 | Stop reason: SDUPTHER

## 2018-08-30 ENCOUNTER — TELEPHONE (OUTPATIENT)
Dept: FAMILY MEDICINE CLINIC | Facility: CLINIC | Age: 68
End: 2018-08-30

## 2018-08-30 NOTE — PROGRESS NOTES
Annette Morrissey is a 67 y.o. male.   Chief Compliant: The patient presents with Flank Pain (right; swelling and sensitive) and Sinus Problem    Sinus Problem   This is a new problem. The current episode started in the past 7 days. The problem has been waxing and waning since onset. There has been no fever. He is experiencing no pain. Associated symptoms include congestion. Pertinent negatives include no coughing or shortness of breath. (Runny nose) Past treatments include nothing.   Abdominal Pain   This is a new problem. The current episode started 1 to 4 weeks ago (Approx 2 weeks ago started with pain in the right upper abd since very tender and sensitive with touch). The onset quality is gradual. The problem occurs daily. The problem has been unchanged. The pain is located in the RUQ. The pain is at a severity of 3/10. The pain is mild. Quality: tenderness with touch and feels swollen. Pain radiation: right side. Pertinent negatives include no anorexia, arthralgias, belching, constipation, diarrhea, dysuria, fever, frequency, hematochezia, hematuria, nausea, vomiting or weight loss. The pain is aggravated by palpation. He has tried nothing for the symptoms.      The following portions of the patient's history were reviewed and updated as appropriate: allergies, current medications, past family history, past medical history, past social history, past surgical history and problem list.      Review of Systems   Constitutional: Negative for activity change, fatigue, fever and weight loss.        Tolerating routine activity with exercise      HENT: Positive for congestion.    Respiratory: Negative for cough, shortness of breath and wheezing.    Cardiovascular: Negative.    Gastrointestinal: Positive for abdominal pain. Negative for abdominal distention, anal bleeding, anorexia, blood in stool, constipation, diarrhea, hematochezia, nausea, rectal pain and vomiting.   Genitourinary: Negative.  Negative for  "dysuria, frequency and hematuria.   Musculoskeletal: Negative.  Negative for arthralgias.   Psychiatric/Behavioral: Negative.    All other systems reviewed and are negative.      Procedures    Vitals: Blood pressure 145/74, pulse 76, height 180.3 cm (71\"), weight 93.3 kg (205 lb 9.6 oz), SpO2 98 %.     Allergies:   Allergies   Allergen Reactions   • Sulfa Antibiotics           Objective   Physical Exam   Constitutional: He is oriented to person, place, and time. He appears well-developed and well-nourished. No distress.   HENT:   Head: Normocephalic.   Right Ear: External ear normal.   Left Ear: External ear normal.   Nose: Nose normal.   Mouth/Throat: Oropharynx is clear and moist. No oropharyngeal exudate.   Eyes: Pupils are equal, round, and reactive to light. Conjunctivae and EOM are normal. Right eye exhibits no discharge. Left eye exhibits no discharge.   Neck: Normal range of motion. Neck supple. No thyromegaly present.   Cardiovascular: Normal rate, regular rhythm and normal heart sounds.    No murmur heard.  Pulmonary/Chest: Effort normal and breath sounds normal. No respiratory distress. He has no wheezes.   Abdominal: Soft. Normal appearance and bowel sounds are normal. He exhibits no distension and no mass. There is no hepatosplenomegaly or splenomegaly. There is tenderness. There is no rebound and no guarding. No hernia.   Lymphadenopathy:     He has no cervical adenopathy.   Neurological: He is alert and oriented to person, place, and time.   Skin: Skin is warm and dry. He is not diaphoretic.   Psychiatric: He has a normal mood and affect. His behavior is normal.   Nursing note and vitals reviewed.      During this visit the following were done:  Labs Reviewed []    Labs Ordered [x]    Radiology Reports Reviewed []    Radiology Ordered [x]    PCP Records Reviewed []    Referring Provider Records Reviewed []    ER Records Reviewed []    Hospital Records Reviewed []    History Obtained From Family []  "   Radiology Images Reviewed []    Other Reviewed []    Records Requested []      Assessment/Plan   Discussed with patient impression and plan, patient verbalizes understanding  Will follow after labs and ultrasound.  Sooner with any concerns.  Discussed with patient pain and sensitivity may be related to shingles without a rash.  Symptom treatment for now  Bill was seen today for flank pain and sinus problem.    Diagnoses and all orders for this visit:    RUQ pain  -     CBC & Differential  -     Comprehensive Metabolic Panel  -     Lipid Panel  -     Amylase  -     Lipase  -     US Abdomen Complete  -     CBC Auto Differential  -     Osmolality, Calculated; Future  -     Osmolality, Calculated    Acute seasonal allergic rhinitis, unspecified trigger    Other orders  -     naproxen (NAPROSYN) 500 MG tablet; Take 1 tablet by mouth 2 (Two) Times a Day With Meals.

## 2018-08-30 NOTE — TELEPHONE ENCOUNTER
----- Message from JOSE D Sommers sent at 8/30/2018  8:44 AM EDT -----  Let patient know labs are ok   Will follow after ultrasound    Patient notified & verbalized understanding.

## 2018-09-07 ENCOUNTER — HOSPITAL ENCOUNTER (OUTPATIENT)
Dept: ULTRASOUND IMAGING | Facility: HOSPITAL | Age: 68
Discharge: HOME | End: 2018-09-07
Admitting: NURSE PRACTITIONER

## 2018-09-07 PROCEDURE — 76700 US EXAM ABDOM COMPLETE: CPT | Performed by: RADIOLOGY

## 2018-09-07 PROCEDURE — 76700 US EXAM ABDOM COMPLETE: CPT

## 2018-10-15 ENCOUNTER — OFFICE VISIT (OUTPATIENT)
Dept: UROLOGY | Facility: CLINIC | Age: 68
End: 2018-10-15

## 2018-10-15 VITALS — BODY MASS INDEX: 28.7 KG/M2 | HEIGHT: 71 IN | WEIGHT: 205 LBS

## 2018-10-15 DIAGNOSIS — R79.89 LOW TESTOSTERONE: ICD-10-CM

## 2018-10-15 DIAGNOSIS — R79.89 LOW TESTOSTERONE IN MALE: Primary | ICD-10-CM

## 2018-10-15 DIAGNOSIS — N52.9 ERECTILE DYSFUNCTION, UNSPECIFIED ERECTILE DYSFUNCTION TYPE: ICD-10-CM

## 2018-10-15 DIAGNOSIS — E29.1 HYPOGONADISM IN MALE: ICD-10-CM

## 2018-10-15 LAB
DEPRECATED RDW RBC AUTO: 41 FL (ref 37–54)
ERYTHROCYTE [DISTWIDTH] IN BLOOD BY AUTOMATED COUNT: 12.4 % (ref 11.5–14.5)
HCT VFR BLD AUTO: 42.9 % (ref 42–52)
HGB BLD-MCNC: 14.5 G/DL (ref 14–18)
MCH RBC QN AUTO: 31.5 PG (ref 27–33)
MCHC RBC AUTO-ENTMCNC: 33.8 G/DL (ref 33–37)
MCV RBC AUTO: 93.1 FL (ref 80–94)
PLATELET # BLD AUTO: 200 10*3/MM3 (ref 130–400)
PMV BLD AUTO: 11.1 FL (ref 6–10)
PSA SERPL-MCNC: 0.52 NG/ML (ref 0–4)
RBC # BLD AUTO: 4.61 10*6/MM3 (ref 4.7–6.1)
TESTOST SERPL-MCNC: >1500 NG/DL (ref 86.98–780.1)
WBC NRBC COR # BLD: 7.02 10*3/MM3 (ref 4.5–12.5)

## 2018-10-15 PROCEDURE — 36415 COLL VENOUS BLD VENIPUNCTURE: CPT | Performed by: UROLOGY

## 2018-10-15 PROCEDURE — 99213 OFFICE O/P EST LOW 20 MIN: CPT | Performed by: UROLOGY

## 2018-10-15 PROCEDURE — 85027 COMPLETE CBC AUTOMATED: CPT | Performed by: UROLOGY

## 2018-10-15 PROCEDURE — 84403 ASSAY OF TOTAL TESTOSTERONE: CPT | Performed by: UROLOGY

## 2018-10-15 PROCEDURE — 84153 ASSAY OF PSA TOTAL: CPT | Performed by: UROLOGY

## 2018-10-15 RX ORDER — SILDENAFIL CITRATE 20 MG/1
TABLET ORAL
Qty: 24 TABLET | Refills: 11 | Status: SHIPPED | OUTPATIENT
Start: 2018-10-15 | End: 2019-04-22 | Stop reason: SDUPTHER

## 2018-10-15 RX ORDER — TESTOSTERONE CYPIONATE 200 MG/ML
INJECTION, SOLUTION INTRAMUSCULAR
Qty: 10 ML | Refills: 2 | Status: SHIPPED | OUTPATIENT
Start: 2018-10-15 | End: 2019-04-22 | Stop reason: SDUPTHER

## 2018-10-15 NOTE — PROGRESS NOTES
"Chief Complaint:          Chief Complaint   Patient presents with   • Hypogonadism       HPI:   67 y.o. male.  7-year-old white male who was doing fantastic.  He feels great he's doing well he's lost 31 pounds his erections and ejaculations or excellent without the need for oral sildenafil.  He recently had some thickening on his right flank that had an ultrasound that was negative.  He thought maybe it was an injection complication..Patient returns today for follow-up.  He is been on testosterone replacement therapy.  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  This is a validated questionnaire that was performed on a set of 314 [place] male physicians when it was positive it correlated directly with a 94% chance of low testosterone.  Patient indicates there is a decrease in libido or sex drive, a lack of energy, Decreased  strength and endurance, a decreased \"enjoyment of life\", sad and grumpy feelings with significant difficulty maintaining erections.  He is also been a recent deterioration regarding work performance.  He reports weight loss.  He has good facility and the use of subcutaneous and intramuscular injections as well as comfort level and using the medication in a sterile fashion.  He understands he should use only the prescribed dose.  He's here for appropriate lab monitoring regarding this.  He understands this is a controlled substance and therefore must be watched closely will not be refilled and the medical loss or miss calculation of the dose.  He is very happy with the treatment and therefore wants to continue it.    Past Medical History:        Past Medical History:   Diagnosis Date   • Arthritis    • Dyslipidemia    • Fatigue    • Hyperlipidemia    • Lower back pain    • Lumbar strain    • Myalgia    • Rib pain on right side    • " Rotator cuff injury 1993    bucked of horse         Current Meds:     Current Outpatient Prescriptions   Medication Sig Dispense Refill   • anastrozole (ARIMIDEX) 1 MG tablet Take 1 tablet by mouth 1 (One) Time Per Week. 12 tablet 2   • methocarbamol (ROBAXIN) 750 MG tablet Take 1 tablet by mouth 2 (Two) Times a Day. 60 tablet 5   • naproxen (NAPROSYN) 500 MG tablet Take 1 tablet by mouth 2 (Two) Times a Day With Meals. 60 tablet 0   • sildenafil (REVATIO) 20 MG tablet TAKE 1 TO 5 TABLETS BY MOUTH ONE HOUR PRIOR TO INTERCOURSE ON AN EMPTY STOMACH 24 tablet 10   • Syringe, Disposable, 3 ML misc Use 3 ml syringe with a 25 gauge  5/8 inch needle 24 each 6   • Testosterone Cypionate (DEPO-TESTOSTERONE) 200 MG/ML injection He is to use 1/2 cc every Monday and Thursday SQ 10 mL 2     No current facility-administered medications for this visit.         Allergies:      Allergies   Allergen Reactions   • Sulfa Antibiotics         Past Surgical History:     History reviewed. No pertinent surgical history.      Social History:     Social History     Social History   • Marital status:      Spouse name: N/A   • Number of children: N/A   • Years of education: N/A     Occupational History   •       Social History Main Topics   • Smoking status: Never Smoker   • Smokeless tobacco: Current User     Types: Snuff   • Alcohol use No   • Drug use: No   • Sexual activity: Defer     Other Topics Concern   • Not on file     Social History Narrative   • No narrative on file       Family History:     Family History   Problem Relation Age of Onset   • Family history unknown: Yes       Review of Systems:     Review of Systems   Constitutional: Negative.  Negative for chills, fatigue and fever.   HENT: Negative.    Eyes: Negative.    Respiratory: Negative.  Negative for cough, shortness of breath and wheezing.    Cardiovascular: Negative.  Negative for leg swelling.   Gastrointestinal: Negative.  Negative for abdominal pain,  nausea and vomiting.   Endocrine: Negative.    Musculoskeletal: Negative.  Negative for back pain and joint swelling.   Allergic/Immunologic: Negative.    Neurological: Negative.  Negative for dizziness and headaches.   Hematological: Negative.    Psychiatric/Behavioral: Negative.  Negative for confusion.       Physical Exam:     Physical Exam   Constitutional: He is oriented to person, place, and time. He appears well-developed and well-nourished.   HENT:   Head: Normocephalic and atraumatic.   Eyes: Pupils are equal, round, and reactive to light. Conjunctivae and EOM are normal.   Neck: Normal range of motion.   Cardiovascular: Normal rate, regular rhythm, normal heart sounds and intact distal pulses.    Pulmonary/Chest: Effort normal and breath sounds normal.   Abdominal: Soft. Bowel sounds are normal.   Musculoskeletal: Normal range of motion.   Neurological: He is alert and oriented to person, place, and time. He has normal reflexes.   Skin: Skin is warm and dry.   Psychiatric: He has a normal mood and affect. His behavior is normal. Judgment and thought content normal.   Nursing note and vitals reviewed.      I have reviewed the following portions of the patient's history: allergies, current medications, past family history, past medical history, past social history, past surgical history, problem list and ROS and confirm it's accurate.      Procedure:       Assessment/Plan:   -Low testosterone: patient is here for follow-up.  Since beginning the medication he's been very pleased.  He reports a dramatic improvement in his erections, ability to achieve and maintain an erection, improvement in libido, increase in frequency of morning erections, a noticeable weight loss consistent with the treatment.  No development of breast problems or abnormalities.  He's going to have appropriate safety laboratory parameters checked.   He understands that the new data implicates testosterone with the development of prostate  cancer and this is all but been disproven and the medical literature as well as the risks of cardiovascular disease which is actually also been disproven.  He understands that while he is a candidate for topical therapy if he is in contact with children this is not an option because it's been shown to accentuate genitalia development at an early age that this frequently irreversible.  He also understands this is a controlled substance and as such will not be prescribed without appropriate follow-up and appropriate laboratory investigation.  He understands effects on spermatogenesis including the fact that this is not always completely reversible and not always completely limited his ability to father a child.  He has demonstrated facility in the technique of both intramuscular and subcutaneous injection.  And has been taught sterility one drawing up the medication.     Patient's Body mass index is 28.59 kg/m². BMI is above normal parameters. Recommendations include: educational material.          This document has been electronically signed by OVIDIO ESTEVEZ MD October 15, 2018 3:48 PM

## 2019-01-31 ENCOUNTER — TELEPHONE (OUTPATIENT)
Dept: UROLOGY | Facility: CLINIC | Age: 69
End: 2019-01-31

## 2019-01-31 DIAGNOSIS — R79.89 LOW TESTOSTERONE IN MALE: ICD-10-CM

## 2019-01-31 DIAGNOSIS — E29.1 HYPOGONADISM IN MALE: ICD-10-CM

## 2019-02-01 ENCOUNTER — TELEPHONE (OUTPATIENT)
Dept: UROLOGY | Facility: CLINIC | Age: 69
End: 2019-02-01

## 2019-02-01 RX ORDER — TESTOSTERONE CYPIONATE 200 MG/ML
INJECTION, SOLUTION INTRAMUSCULAR
Qty: 10 ML | Refills: 2 | OUTPATIENT
Start: 2019-02-01

## 2019-02-01 RX ORDER — ANASTROZOLE 1 MG/1
TABLET ORAL
Qty: 4 TABLET | Refills: 1 | OUTPATIENT
Start: 2019-02-01

## 2019-04-22 ENCOUNTER — OFFICE VISIT (OUTPATIENT)
Dept: UROLOGY | Facility: CLINIC | Age: 69
End: 2019-04-22

## 2019-04-22 VITALS — HEIGHT: 71 IN | BODY MASS INDEX: 28.7 KG/M2 | WEIGHT: 205 LBS

## 2019-04-22 DIAGNOSIS — E29.1 HYPOGONADISM IN MALE: ICD-10-CM

## 2019-04-22 DIAGNOSIS — R79.89 LOW TESTOSTERONE: ICD-10-CM

## 2019-04-22 DIAGNOSIS — N42.9 DISORDER OF PROSTATE: Primary | ICD-10-CM

## 2019-04-22 LAB
DEPRECATED RDW RBC AUTO: 43.5 FL (ref 37–54)
ERYTHROCYTE [DISTWIDTH] IN BLOOD BY AUTOMATED COUNT: 12.4 % (ref 12.3–15.4)
HCT VFR BLD AUTO: 44.9 % (ref 37.5–51)
HGB BLD-MCNC: 14.3 G/DL (ref 13–17.7)
MCH RBC QN AUTO: 30.4 PG (ref 26.6–33)
MCHC RBC AUTO-ENTMCNC: 31.8 G/DL (ref 31.5–35.7)
MCV RBC AUTO: 95.3 FL (ref 79–97)
PLATELET # BLD AUTO: 215 10*3/MM3 (ref 140–450)
PMV BLD AUTO: 11.7 FL (ref 6–12)
PSA SERPL-MCNC: 0.61 NG/ML (ref 0–4)
RBC # BLD AUTO: 4.71 10*6/MM3 (ref 4.14–5.8)
WBC NRBC COR # BLD: 6.48 10*3/MM3 (ref 3.4–10.8)

## 2019-04-22 PROCEDURE — 84403 ASSAY OF TOTAL TESTOSTERONE: CPT | Performed by: UROLOGY

## 2019-04-22 PROCEDURE — 84153 ASSAY OF PSA TOTAL: CPT | Performed by: UROLOGY

## 2019-04-22 PROCEDURE — 99213 OFFICE O/P EST LOW 20 MIN: CPT | Performed by: UROLOGY

## 2019-04-22 PROCEDURE — 85027 COMPLETE CBC AUTOMATED: CPT | Performed by: UROLOGY

## 2019-04-22 PROCEDURE — 36415 COLL VENOUS BLD VENIPUNCTURE: CPT | Performed by: UROLOGY

## 2019-04-22 RX ORDER — SILDENAFIL CITRATE 20 MG/1
TABLET ORAL
Qty: 24 TABLET | Refills: 11 | Status: SHIPPED | OUTPATIENT
Start: 2019-04-22 | End: 2020-04-13 | Stop reason: SDUPTHER

## 2019-04-22 RX ORDER — TESTOSTERONE CYPIONATE 200 MG/ML
INJECTION, SOLUTION INTRAMUSCULAR
Qty: 10 ML | Refills: 2 | Status: SHIPPED | OUTPATIENT
Start: 2019-04-22 | End: 2019-10-17 | Stop reason: SDUPTHER

## 2019-04-22 NOTE — PROGRESS NOTES
"Chief Complaint:          Chief Complaint   Patient presents with   • Hypogonadism     Follow up       HPI:   68 y.o. male.  68-year-old white male returns today he is doing fantastic he looks great he is lost 40 pounds.  Patient returns today for follow-up.  He is been on testosterone replacement therapy.  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  This is a validated questionnaire that was performed on a set of 314 [place] male physicians when it was positive it correlated directly with a 94% chance of low testosterone.  Patient indicates there is a decrease in libido or sex drive, a lack of energy, Decreased  strength and endurance, a decreased \"enjoyment of life\", sad and grumpy feelings with significant difficulty maintaining erections.  He is also been a recent deterioration regarding work performance.  He reports weight loss.  He has good facility and the use of subcutaneous and intramuscular injections as well as comfort level and using the medication in a sterile fashion.  He understands he should use only the prescribed dose.  He's here for appropriate lab monitoring regarding this.  He understands this is a controlled substance and therefore must be watched closely will not be refilled and the medical loss or miss calculation of the dose.  He is very happy with the treatment and therefore wants to continue it.    Past Medical History:        Past Medical History:   Diagnosis Date   • Arthritis    • Dyslipidemia    • Fatigue    • Hyperlipidemia    • Lower back pain    • Lumbar strain    • Myalgia    • Rib pain on right side    • Rotator cuff injury 1993    bucked of horse         Current Meds:     Current Outpatient Medications   Medication Sig Dispense Refill   • anastrozole (ARIMIDEX) 1 MG tablet Take 1 tablet by mouth 1 (One) Time Per Week. " 12 tablet 2   • methocarbamol (ROBAXIN) 750 MG tablet Take 1 tablet by mouth 2 (Two) Times a Day. 60 tablet 5   • naproxen (NAPROSYN) 500 MG tablet Take 1 tablet by mouth 2 (Two) Times a Day With Meals. 60 tablet 0   • sildenafil (REVATIO) 20 MG tablet TAKE 1 TO 5 TABLETS BY MOUTH ONE HOUR PRIOR TO INTERCOURSE ON AN EMPTY STOMACH 24 tablet 10   • sildenafil (REVATIO) 20 MG tablet 1-5 by mouth one hour prior to intercourse on an empty stomach 24 tablet 11   • Syringe, Disposable, 3 ML misc Use 3 ml syringe with a 25 gauge  5/8 inch needle 24 each 6   • Testosterone Cypionate (DEPO-TESTOSTERONE) 200 MG/ML injection He is to use 1/2 cc every Monday and Thursday SQ 10 mL 2     No current facility-administered medications for this visit.         Allergies:      Allergies   Allergen Reactions   • Sulfa Antibiotics         Past Surgical History:     History reviewed. No pertinent surgical history.      Social History:     Social History     Socioeconomic History   • Marital status:      Spouse name: Not on file   • Number of children: Not on file   • Years of education: Not on file   • Highest education level: Not on file   Occupational History   • Occupation:    Tobacco Use   • Smoking status: Never Smoker   • Smokeless tobacco: Current User     Types: Snuff   Substance and Sexual Activity   • Alcohol use: No   • Drug use: No   • Sexual activity: Defer       Family History:     Family History   Family history unknown: Yes       Review of Systems:     Review of Systems   Constitutional: Negative.    HENT: Negative.    Eyes: Negative.    Respiratory: Negative.    Cardiovascular: Negative.    Gastrointestinal: Negative.    Endocrine: Negative.    Musculoskeletal: Negative.    Allergic/Immunologic: Negative.    Neurological: Negative.    Hematological: Negative.    Psychiatric/Behavioral: Negative.        Physical Exam:     Physical Exam   Constitutional: He is oriented to person, place, and time. He  appears well-developed and well-nourished.   HENT:   Head: Normocephalic and atraumatic.   Eyes: Conjunctivae and EOM are normal. Pupils are equal, round, and reactive to light.   Neck: Normal range of motion.   Cardiovascular: Normal rate, regular rhythm, normal heart sounds and intact distal pulses.   Pulmonary/Chest: Effort normal and breath sounds normal.   Abdominal: Soft. Bowel sounds are normal.   Musculoskeletal: Normal range of motion.   Neurological: He is alert and oriented to person, place, and time. He has normal reflexes.   Skin: Skin is warm and dry.   Psychiatric: He has a normal mood and affect. His behavior is normal. Judgment and thought content normal.   Nursing note and vitals reviewed.      I have reviewed the following portions of the patient's history: allergies, current medications, past family history, past medical history, past social history, past surgical history, problem list and ROS and confirm it's accurate.      Procedure:       Assessment/Plan:   Low testosterone:  patient is here for follow-up.  Since beginning the medication he's been very pleased.  He reports a dramatic improvement in his erections, ability to achieve and maintain an erection, improvement in libido, increase in frequency of morning erections, a noticeable weight loss consistent with the treatment.  No development of breast problems or abnormalities.  He's going to have appropriate safety laboratory parameters checked.   He understands that the new data implicates testosterone with the development of prostate cancer and this is all but been disproven and the medical literature as well as the risks of cardiovascular disease which is actually also been disproven.  He understands that while he is a candidate for topical therapy if he is in contact with children this is not an option because it's been shown to accentuate genitalia development at an early age that this frequently irreversible.  He also understands this  is a controlled substance and as such will not be prescribed without appropriate follow-up and appropriate laboratory investigation.  He understands effects on spermatogenesis including the fact that this is not always completely reversible and not always completely limited his ability to father a child.  He has demonstrated facility in the technique of both intramuscular and subcutaneous injection.  And has been taught sterility one drawing up the medication.    Patient's Body mass index is 28.59 kg/m². BMI is above normal parameters. Recommendations include: educational material.          This document has been electronically signed by OVIDIO ESTEVEZ MD April 22, 2019 3:37 PM

## 2019-04-23 LAB — TESTOST SERPL-MCNC: >1500 NG/DL (ref 193–740)

## 2019-06-21 RX ORDER — NAPROXEN 500 MG/1
TABLET ORAL
Qty: 60 TABLET | Refills: 0 | OUTPATIENT
Start: 2019-06-21

## 2019-10-17 ENCOUNTER — OFFICE VISIT (OUTPATIENT)
Dept: UROLOGY | Facility: CLINIC | Age: 69
End: 2019-10-17

## 2019-10-17 VITALS — BODY MASS INDEX: 28.19 KG/M2 | WEIGHT: 201.4 LBS | HEIGHT: 71 IN

## 2019-10-17 DIAGNOSIS — N52.9 ERECTILE DYSFUNCTION, UNSPECIFIED ERECTILE DYSFUNCTION TYPE: ICD-10-CM

## 2019-10-17 DIAGNOSIS — R79.89 LOW TESTOSTERONE: Primary | ICD-10-CM

## 2019-10-17 DIAGNOSIS — E29.1 HYPOGONADISM IN MALE: ICD-10-CM

## 2019-10-17 DIAGNOSIS — R79.89 LOW TESTOSTERONE IN MALE: ICD-10-CM

## 2019-10-17 PROCEDURE — 36415 COLL VENOUS BLD VENIPUNCTURE: CPT | Performed by: UROLOGY

## 2019-10-17 PROCEDURE — 84403 ASSAY OF TOTAL TESTOSTERONE: CPT | Performed by: UROLOGY

## 2019-10-17 PROCEDURE — 85027 COMPLETE CBC AUTOMATED: CPT | Performed by: UROLOGY

## 2019-10-17 PROCEDURE — 82670 ASSAY OF TOTAL ESTRADIOL: CPT | Performed by: UROLOGY

## 2019-10-17 PROCEDURE — 84153 ASSAY OF PSA TOTAL: CPT | Performed by: UROLOGY

## 2019-10-17 PROCEDURE — 99213 OFFICE O/P EST LOW 20 MIN: CPT | Performed by: UROLOGY

## 2019-10-17 RX ORDER — SILDENAFIL CITRATE 20 MG/1
TABLET ORAL
Qty: 24 TABLET | Refills: 6 | Status: SHIPPED | OUTPATIENT
Start: 2019-10-17 | End: 2020-04-20 | Stop reason: SDUPTHER

## 2019-10-17 RX ORDER — ANASTROZOLE 1 MG/1
1 TABLET ORAL WEEKLY
Qty: 12 TABLET | Refills: 2 | Status: SHIPPED | OUTPATIENT
Start: 2019-10-17 | End: 2020-04-13 | Stop reason: SDUPTHER

## 2019-10-17 RX ORDER — TESTOSTERONE CYPIONATE 200 MG/ML
INJECTION, SOLUTION INTRAMUSCULAR
Qty: 10 ML | Refills: 2 | Status: SHIPPED | OUTPATIENT
Start: 2019-10-17 | End: 2020-04-13 | Stop reason: SDUPTHER

## 2019-10-17 NOTE — PROGRESS NOTES
"Chief Complaint:          Chief Complaint   Patient presents with   • Hypogonadism     f/u        HPI:   68 y.o. male returns today he is now down to 190 pounds, he runs a 5K he was the winter in his age group 3.3 miles and 30.53 minutes.  He is doing great he looks fantastic he is thrilled, he has intercourse is body habitus is the best is ever been.  Patient returns today for follow-up.  He is been on testosterone replacement therapy.  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  This is a validated questionnaire that was performed on a set of 314 [place] male physicians when it was positive it correlated directly with a 94% chance of low testosterone.  Patient indicates there is a decrease in libido or sex drive, a lack of energy, Decreased  strength and endurance, a decreased \"enjoyment of life\", sad and grumpy feelings with significant difficulty maintaining erections.  He is also been a recent deterioration regarding work performance.  He reports weight loss.  He has good facility and the use of subcutaneous and intramuscular injections as well as comfort level and using the medication in a sterile fashion.  He understands he should use only the prescribed dose.  He's here for appropriate lab monitoring regarding this.  He understands this is a controlled substance and therefore must be watched closely will not be refilled and the medical loss or miss calculation of the dose.  He is very happy with the treatment and therefore wants to continue it.      Past Medical History:        Past Medical History:   Diagnosis Date   • Arthritis    • Dyslipidemia    • Fatigue    • Hyperlipidemia    • Lower back pain    • Lumbar strain    • Myalgia    • Rib pain on right side    • Rotator cuff injury 1993    bucked of horse         Current Meds:     Current " Outpatient Medications   Medication Sig Dispense Refill   • anastrozole (ARIMIDEX) 1 MG tablet Take 1 tablet by mouth 1 (One) Time Per Week. 12 tablet 2   • methocarbamol (ROBAXIN) 750 MG tablet Take 1 tablet by mouth 2 (Two) Times a Day. 60 tablet 5   • naproxen (NAPROSYN) 500 MG tablet Take 1 tablet by mouth 2 (Two) Times a Day With Meals. 60 tablet 0   • sildenafil (REVATIO) 20 MG tablet TAKE 1 TO 5 TABLETS BY MOUTH ONE HOUR PRIOR TO INTERCOURSE ON AN EMPTY STOMACH 24 tablet 10   • sildenafil (REVATIO) 20 MG tablet 1-5 by mouth one hour prior to intercourse on an empty stomach 24 tablet 11   • sildenafil (REVATIO) 20 MG tablet May take up to 5 by mouth one hour prior to intercourse on an empty stomach 24 tablet 6   • Syringe, Disposable, 3 ML misc Use 3 ml syringe with a 25 gauge  5/8 inch needle 24 each 6   • Testosterone Cypionate (DEPO-TESTOSTERONE) 200 MG/ML injection He is to use 1/2 cc every Monday and Thursday SQ 10 mL 2     No current facility-administered medications for this visit.         Allergies:      Allergies   Allergen Reactions   • Sulfa Antibiotics         Past Surgical History:     History reviewed. No pertinent surgical history.      Social History:     Social History     Socioeconomic History   • Marital status:      Spouse name: Not on file   • Number of children: Not on file   • Years of education: Not on file   • Highest education level: Not on file   Occupational History   • Occupation:    Tobacco Use   • Smoking status: Never Smoker   • Smokeless tobacco: Current User     Types: Snuff   Substance and Sexual Activity   • Alcohol use: No   • Drug use: No   • Sexual activity: Defer       Family History:     Family History   Family history unknown: Yes       Review of Systems:     Review of Systems   Constitutional: Negative.    HENT: Negative.    Eyes: Negative.    Respiratory: Negative.    Cardiovascular: Negative.    Gastrointestinal: Negative.    Endocrine: Negative.     Musculoskeletal: Negative.    Allergic/Immunologic: Negative.    Neurological: Negative.    Hematological: Negative.    Psychiatric/Behavioral: Negative.        Physical Exam:     Physical Exam   Constitutional: He is oriented to person, place, and time. He appears well-developed and well-nourished.   HENT:   Head: Normocephalic and atraumatic.   Eyes: Conjunctivae and EOM are normal. Pupils are equal, round, and reactive to light.   Neck: Normal range of motion.   Cardiovascular: Normal rate, regular rhythm, normal heart sounds and intact distal pulses.   Pulmonary/Chest: Effort normal and breath sounds normal.   Abdominal: Soft. Bowel sounds are normal.   Musculoskeletal: Normal range of motion.   Neurological: He is alert and oriented to person, place, and time. He has normal reflexes.   Skin: Skin is warm and dry.   Psychiatric: He has a normal mood and affect. His behavior is normal. Judgment and thought content normal.   Nursing note and vitals reviewed.      I have reviewed the following portions of the patient's history: allergies, current medications, past family history, past medical history, past social history, past surgical history, problem list and ROS and confirm it's accurate.      Procedure:       Assessment/Plan:   Low testosterone:  patient is here for follow-up.  Since beginning the medication he's been very pleased.  He reports a dramatic improvement in his erections, ability to achieve and maintain an erection, improvement in libido, increase in frequency of morning erections, a noticeable weight loss consistent with the treatment.  No development of breast problems or abnormalities.  He's going to have appropriate safety laboratory parameters checked.   He understands that the new data implicates testosterone with the development of prostate cancer and this is all but been disproven and the medical literature as well as the risks of cardiovascular disease which is actually also been  disproven.  He understands that while he is a candidate for topical therapy if he is in contact with children this is not an option because it's been shown to accentuate genitalia development at an early age that this frequently irreversible.  He also understands this is a controlled substance and as such will not be prescribed without appropriate follow-up and appropriate laboratory investigation.  He understands effects on spermatogenesis including the fact that this is not always completely reversible and not always completely limited his ability to father a child.  He has demonstrated facility in the technique of both intramuscular and subcutaneous injection.  And has been taught sterility one drawing up the medication.      .      Patient's Body mass index is 28.59 kg/m². BMI is above normal parameters. Recommendations include: educational material.              This document has been electronically signed by OVIDIO ESTEVEZ MD October 17, 2019 2:33 PM

## 2019-10-18 LAB
DEPRECATED RDW RBC AUTO: 41.9 FL (ref 37–54)
ERYTHROCYTE [DISTWIDTH] IN BLOOD BY AUTOMATED COUNT: 12.2 % (ref 12.3–15.4)
ESTRADIOL SERPL HS-MCNC: 48.3 PG/ML
HCT VFR BLD AUTO: 41.6 % (ref 37.5–51)
HGB BLD-MCNC: 13.9 G/DL (ref 13–17.7)
MCH RBC QN AUTO: 31 PG (ref 26.6–33)
MCHC RBC AUTO-ENTMCNC: 33.4 G/DL (ref 31.5–35.7)
MCV RBC AUTO: 92.7 FL (ref 79–97)
PLATELET # BLD AUTO: 212 10*3/MM3 (ref 140–450)
PMV BLD AUTO: 11.7 FL (ref 6–12)
PSA SERPL-MCNC: 0.52 NG/ML (ref 0–4)
RBC # BLD AUTO: 4.49 10*6/MM3 (ref 4.14–5.8)
TESTOST SERPL-MCNC: >1500 NG/DL (ref 193–740)
WBC NRBC COR # BLD: 6.58 10*3/MM3 (ref 3.4–10.8)

## 2019-10-30 DIAGNOSIS — E29.1 HYPOGONADISM IN MALE: ICD-10-CM

## 2019-10-30 DIAGNOSIS — R79.89 LOW TESTOSTERONE IN MALE: ICD-10-CM

## 2019-10-31 RX ORDER — ANASTROZOLE 1 MG/1
TABLET ORAL
Qty: 4 TABLET | Refills: 1 | OUTPATIENT
Start: 2019-10-31

## 2019-10-31 RX ORDER — TESTOSTERONE CYPIONATE 200 MG/ML
INJECTION, SOLUTION INTRAMUSCULAR
Refills: 1 | OUTPATIENT
Start: 2019-10-31

## 2020-04-13 ENCOUNTER — OFFICE VISIT (OUTPATIENT)
Dept: UROLOGY | Facility: CLINIC | Age: 70
End: 2020-04-13

## 2020-04-13 VITALS — WEIGHT: 190 LBS | BODY MASS INDEX: 26.6 KG/M2 | HEIGHT: 71 IN

## 2020-04-13 DIAGNOSIS — E29.1 HYPOGONADISM IN MALE: ICD-10-CM

## 2020-04-13 DIAGNOSIS — N52.9 ERECTILE DYSFUNCTION, UNSPECIFIED ERECTILE DYSFUNCTION TYPE: ICD-10-CM

## 2020-04-13 DIAGNOSIS — R79.89 LOW TESTOSTERONE IN MALE: ICD-10-CM

## 2020-04-13 PROCEDURE — 99441 PR PHYS/QHP TELEPHONE EVALUATION 5-10 MIN: CPT | Performed by: UROLOGY

## 2020-04-13 RX ORDER — SILDENAFIL CITRATE 20 MG/1
TABLET ORAL
Qty: 60 TABLET | Refills: 11 | Status: SHIPPED | OUTPATIENT
Start: 2020-04-13 | End: 2020-04-20 | Stop reason: SDUPTHER

## 2020-04-13 RX ORDER — ANASTROZOLE 1 MG/1
1 TABLET ORAL WEEKLY
Qty: 12 TABLET | Refills: 2 | Status: SHIPPED | OUTPATIENT
Start: 2020-04-13 | End: 2020-04-20 | Stop reason: SDUPTHER

## 2020-04-13 RX ORDER — TESTOSTERONE CYPIONATE 200 MG/ML
INJECTION, SOLUTION INTRAMUSCULAR
Qty: 10 ML | Refills: 2 | Status: SHIPPED | OUTPATIENT
Start: 2020-04-13 | End: 2020-04-20 | Stop reason: SDUPTHER

## 2020-04-13 NOTE — PROGRESS NOTES
Chief Complaint:          No chief complaint on file.      HPI:   69 y.o. male.  You have chosen to receive care through a telephone visit. Do you consent to use a telephone visit for your medical care today? Yes    Past Medical History:        Past Medical History:   Diagnosis Date   • Arthritis    • Dyslipidemia    • Fatigue    • Hyperlipidemia    • Lower back pain    • Lumbar strain    • Myalgia    • Rib pain on right side    • Rotator cuff injury 1993    bucked of horse         Current Meds:     Current Outpatient Medications   Medication Sig Dispense Refill   • anastrozole (ARIMIDEX) 1 MG tablet Take 1 tablet by mouth 1 (One) Time Per Week. 12 tablet 2   • methocarbamol (ROBAXIN) 750 MG tablet Take 1 tablet by mouth 2 (Two) Times a Day. 60 tablet 5   • naproxen (NAPROSYN) 500 MG tablet Take 1 tablet by mouth 2 (Two) Times a Day With Meals. 60 tablet 0   • sildenafil (REVATIO) 20 MG tablet TAKE 1 TO 5 TABLETS BY MOUTH ONE HOUR PRIOR TO INTERCOURSE ON AN EMPTY STOMACH 24 tablet 10   • sildenafil (REVATIO) 20 MG tablet 1-5 by mouth one hour prior to intercourse on an empty stomach 24 tablet 11   • sildenafil (REVATIO) 20 MG tablet May take up to 5 by mouth one hour prior to intercourse on an empty stomach 24 tablet 6   • Syringe, Disposable, 3 ML misc Use 3 ml syringe with a 25 gauge  5/8 inch needle 24 each 6   • Testosterone Cypionate (DEPO-TESTOSTERONE) 200 MG/ML injection He is to use 1/2 cc every Monday and Thursday SQ 10 mL 2     No current facility-administered medications for this visit.         Allergies:      Allergies   Allergen Reactions   • Sulfa Antibiotics         Past Surgical History:     No past surgical history on file.      Social History:     Social History     Socioeconomic History   • Marital status:      Spouse name: Not on file   • Number of children: Not on file   • Years of education: Not on file   • Highest education level: Not on file   Occupational History   • Occupation: truck     Tobacco Use   • Smoking status: Never Smoker   • Smokeless tobacco: Current User     Types: Snuff   Substance and Sexual Activity   • Alcohol use: No   • Drug use: No   • Sexual activity: Defer       Family History:     Family History   Family history unknown: Yes       Review of Systems:     Review of Systems    Physical Exam:     Physical Exam    ***    Procedure:       Assessment/Plan:   ***       Patient reports that he is not currently experiencing any symptoms of urinary incontinence.      Patient's Body mass index is 26.5 kg/m². BMI is {BMI range:96837}.              This document has been electronically signed by OVIDIO ESTEVEZ MD April 13, 2020 13:32

## 2020-04-15 ENCOUNTER — APPOINTMENT (OUTPATIENT)
Dept: LAB | Facility: HOSPITAL | Age: 70
End: 2020-04-15

## 2020-04-15 LAB
DEPRECATED RDW RBC AUTO: 39 FL (ref 37–54)
ERYTHROCYTE [DISTWIDTH] IN BLOOD BY AUTOMATED COUNT: 11.7 % (ref 12.3–15.4)
HCT VFR BLD AUTO: 44.2 % (ref 37.5–51)
HGB BLD-MCNC: 14.7 G/DL (ref 13–17.7)
MCH RBC QN AUTO: 30.8 PG (ref 26.6–33)
MCHC RBC AUTO-ENTMCNC: 33.3 G/DL (ref 31.5–35.7)
MCV RBC AUTO: 92.5 FL (ref 79–97)
PLATELET # BLD AUTO: 217 10*3/MM3 (ref 140–450)
PMV BLD AUTO: 11.6 FL (ref 6–12)
PSA SERPL-MCNC: 0.56 NG/ML (ref 0–4)
RBC # BLD AUTO: 4.78 10*6/MM3 (ref 4.14–5.8)
TESTOST SERPL-MCNC: >1500 NG/DL (ref 193–740)
WBC NRBC COR # BLD: 4.55 10*3/MM3 (ref 3.4–10.8)

## 2020-04-15 PROCEDURE — 85027 COMPLETE CBC AUTOMATED: CPT | Performed by: UROLOGY

## 2020-04-15 PROCEDURE — 84403 ASSAY OF TOTAL TESTOSTERONE: CPT | Performed by: UROLOGY

## 2020-04-15 PROCEDURE — 36415 COLL VENOUS BLD VENIPUNCTURE: CPT | Performed by: UROLOGY

## 2020-04-15 PROCEDURE — 84153 ASSAY OF PSA TOTAL: CPT | Performed by: UROLOGY

## 2020-04-16 PROBLEM — E29.1 HYPOGONADISM IN MALE: Status: ACTIVE | Noted: 2020-04-16

## 2020-04-16 NOTE — PROGRESS NOTES
"Chief Complaint:          TRT    HPI:   69 y.o. male patient returns today for follow-up.  He is been on testosterone replacement therapy.  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  This is a validated questionnaire that was performed on a set of 314 [place] male physicians when it was positive it correlated directly with a 94% chance of low testosterone.  Patient indicates there is a decrease in libido or sex drive, a lack of energy, Decreased  strength and endurance, a decreased \"enjoyment of life\", sad and grumpy feelings with significant difficulty maintaining erections.  He is also been a recent deterioration regarding work performance.  He reports weight loss.  He has good facility and the use of subcutaneous and intramuscular injections as well as comfort level and using the medication in a sterile fashion.  He understands he should use only the prescribed dose.  He's here for appropriate lab monitoring regarding this.  He understands this is a controlled substance and therefore must be watched closely will not be refilled and the medical loss or miss calculation of the dose.  He is very happy with the treatment and therefore wants to continue it.  He is not down to 180 pounds.  He has fantastic erections.  This visit has been rescheduled as a phone visit to comply with patient safety concerns in accordance with CDC recommendations. Total time of discussion was 7 minutes.      Past Medical History:        Past Medical History:   Diagnosis Date   • Arthritis    • Dyslipidemia    • Fatigue    • Hyperlipidemia    • Lower back pain    • Lumbar strain    • Myalgia    • Rib pain on right side    • Rotator cuff injury 1993    bucked of horse         Current Meds:     Current Outpatient Medications   Medication Sig Dispense Refill   • anastrozole " (ARIMIDEX) 1 MG tablet Take 1 tablet by mouth 1 (One) Time Per Week. 12 tablet 2   • methocarbamol (ROBAXIN) 750 MG tablet Take 1 tablet by mouth 2 (Two) Times a Day. 60 tablet 5   • naproxen (NAPROSYN) 500 MG tablet Take 1 tablet by mouth 2 (Two) Times a Day With Meals. 60 tablet 0   • sildenafil (REVATIO) 20 MG tablet TAKE 1 TO 5 TABLETS BY MOUTH ONE HOUR PRIOR TO INTERCOURSE ON AN EMPTY STOMACH 24 tablet 10   • sildenafil (REVATIO) 20 MG tablet May take up to 5 by mouth one hour prior to intercourse on an empty stomach 24 tablet 6   • sildenafil (REVATIO) 20 MG tablet 1-5 by mouth one hour prior to intercourse on an empty stomach 60 tablet 11   • Syringe, Disposable, 3 ML misc Use 3 ml syringe with a 25 gauge  5/8 inch needle 24 each 6   • Testosterone Cypionate (Depo-Testosterone) 200 MG/ML injection He is to use 1/2 cc every Monday and Thursday SQ 10 mL 2     No current facility-administered medications for this visit.         Allergies:      Allergies   Allergen Reactions   • Sulfa Antibiotics         Past Surgical History:     No past surgical history on file.      Social History:     Social History     Socioeconomic History   • Marital status:      Spouse name: Not on file   • Number of children: Not on file   • Years of education: Not on file   • Highest education level: Not on file   Occupational History   • Occupation:    Tobacco Use   • Smoking status: Never Smoker   • Smokeless tobacco: Current User     Types: Snuff   Substance and Sexual Activity   • Alcohol use: No   • Drug use: No   • Sexual activity: Defer       Family History:     Family History   Family history unknown: Yes       Review of Systems:     Review of Systems   Constitutional: Negative.    HENT: Negative.    Eyes: Negative.    Respiratory: Negative.    Cardiovascular: Negative.    Gastrointestinal: Negative.    Endocrine: Negative.    Musculoskeletal: Negative.    Allergic/Immunologic: Negative.    Neurological:  Negative.    Hematological: Negative.    Psychiatric/Behavioral: Negative.        Physical Exam:     Physical Exam   Constitutional: He is oriented to person, place, and time.   Neurological: He is alert and oriented to person, place, and time.   Psychiatric: He has a normal mood and affect. His behavior is normal. Judgment and thought content normal.       I have reviewed the following portions of the patient's history: allergies, current medications, past family history, past medical history, past social history, past surgical history, problem list and ROS and confirm it's accurate.      Procedure:       Assessment/Plan:   Low testosterone:  patient is here for follow-up.  Since beginning the medication he's been very pleased.  He reports a dramatic improvement in his erections, ability to achieve and maintain an erection, improvement in libido, increase in frequency of morning erections, a noticeable weight loss consistent with the treatment.  No development of breast problems or abnormalities.  He's going to have appropriate safety laboratory parameters checked.   He understands that the new data implicates testosterone with the development of prostate cancer and this is all but been disproven and the medical literature as well as the risks of cardiovascular disease which is actually also been disproven.  He understands that while he is a candidate for topical therapy if he is in contact with children this is not an option because it's been shown to accentuate genitalia development at an early age that this frequently irreversible.  He also understands this is a controlled substance and as such will not be prescribed without appropriate follow-up and appropriate laboratory investigation.  He understands effects on spermatogenesis including the fact that this is not always completely reversible and not always completely limited his ability to father a child.  He has demonstrated facility in the technique of both  intramuscular and subcutaneous injection.  And has been taught sterility one drawing up the medication.            Patient's Body mass index is 26.5 kg/m². BMI is above normal parameters. Recommendations include: educational material.              This document has been electronically signed by OVIDIO ESTEVEZ MD April 16, 2020 08:00

## 2020-04-18 DIAGNOSIS — E29.1 HYPOGONADISM IN MALE: ICD-10-CM

## 2020-04-20 DIAGNOSIS — R79.89 LOW TESTOSTERONE IN MALE: ICD-10-CM

## 2020-04-20 DIAGNOSIS — E29.1 HYPOGONADISM IN MALE: ICD-10-CM

## 2020-04-20 DIAGNOSIS — N52.9 ERECTILE DYSFUNCTION, UNSPECIFIED ERECTILE DYSFUNCTION TYPE: ICD-10-CM

## 2020-04-20 RX ORDER — TESTOSTERONE CYPIONATE 200 MG/ML
INJECTION, SOLUTION INTRAMUSCULAR
Qty: 10 ML | Refills: 2 | Status: SHIPPED | OUTPATIENT
Start: 2020-04-20 | End: 2020-04-20 | Stop reason: SDUPTHER

## 2020-04-20 RX ORDER — TESTOSTERONE CYPIONATE 200 MG/ML
INJECTION, SOLUTION INTRAMUSCULAR
Refills: 1 | OUTPATIENT
Start: 2020-04-20

## 2020-04-20 NOTE — TELEPHONE ENCOUNTER
Pt had a telephone encounter on the 13th and his medication was sent to the wrong pharmacy. Will route this to [facility] to have Dr Marx send this in for him.

## 2020-04-21 RX ORDER — TESTOSTERONE CYPIONATE 200 MG/ML
INJECTION, SOLUTION INTRAMUSCULAR
Qty: 10 ML | Refills: 2 | Status: SHIPPED | OUTPATIENT
Start: 2020-04-21 | End: 2020-10-15 | Stop reason: SDUPTHER

## 2020-04-21 RX ORDER — SILDENAFIL CITRATE 20 MG/1
TABLET ORAL
Qty: 24 TABLET | Refills: 10 | Status: SHIPPED | OUTPATIENT
Start: 2020-04-21 | End: 2020-10-15 | Stop reason: SDUPTHER

## 2020-04-21 RX ORDER — ANASTROZOLE 1 MG/1
1 TABLET ORAL WEEKLY
Qty: 12 TABLET | Refills: 2 | Status: SHIPPED | OUTPATIENT
Start: 2020-04-21 | End: 2020-10-15 | Stop reason: SDUPTHER

## 2020-07-20 ENCOUNTER — LAB (OUTPATIENT)
Dept: LAB | Facility: HOSPITAL | Age: 70
End: 2020-07-20

## 2020-07-20 ENCOUNTER — OFFICE VISIT (OUTPATIENT)
Dept: FAMILY MEDICINE CLINIC | Facility: CLINIC | Age: 70
End: 2020-07-20

## 2020-07-20 VITALS — HEART RATE: 63 BPM | HEIGHT: 71 IN | BODY MASS INDEX: 26.5 KG/M2 | OXYGEN SATURATION: 99 % | TEMPERATURE: 98.2 F

## 2020-07-20 DIAGNOSIS — R50.9 FEVER, UNSPECIFIED FEVER CAUSE: Primary | ICD-10-CM

## 2020-07-20 DIAGNOSIS — R50.9 FEVER, UNSPECIFIED FEVER CAUSE: ICD-10-CM

## 2020-07-20 PROCEDURE — U0004 COV-19 TEST NON-CDC HGH THRU: HCPCS

## 2020-07-20 PROCEDURE — 99212 OFFICE O/P EST SF 10 MIN: CPT | Performed by: NURSE PRACTITIONER

## 2020-07-20 PROCEDURE — U0002 COVID-19 LAB TEST NON-CDC: HCPCS

## 2020-07-20 NOTE — PROGRESS NOTES
Patient triaged from his vehicle.  Originally made appt for routine labs.  Patient states He developed a fever 9 days ago with acute onset.  Admits he has continued to run a low fever everyday since.  Following onset he went to Urgent care and tested negative for COVID and was able to return to work.  Patient states he continues to feel tired and run down.  No cough or congestion.  Denies any known insect bites.  No known COVID contacts. With symptoms referred to testing center for repeat screening to and quarantine until notified of results and free of fever.  Patient agreed with plan of care  Will follow with results.    No further assessment performed

## 2020-07-21 ENCOUNTER — TELEPHONE (OUTPATIENT)
Dept: FAMILY MEDICINE CLINIC | Facility: CLINIC | Age: 70
End: 2020-07-21

## 2020-07-21 LAB
REF LAB TEST METHOD: NORMAL
SARS-COV-2 RNA RESP QL NAA+PROBE: NOT DETECTED

## 2020-07-21 NOTE — TELEPHONE ENCOUNTER
May not return to work until no fever for 24 hours and free of any associated symptoms       Patient notified.

## 2020-07-21 NOTE — TELEPHONE ENCOUNTER
----- Message from JOSE D Sommers sent at 7/21/2020  1:41 PM EDT -----  COVID not detected.  Still running fever?      Spoke with patient ,reports he is feeling pretty good,temp. 99 today has been up to 99.5 but has been active today & feels pretty good.

## 2020-07-21 NOTE — TELEPHONE ENCOUNTER
PATIENT CALLED CHECKING ON COVID RESULTS. TESTING SHOWED THAT COVID WAS NOT DETECTED. PATIENT STATES HE IS STILL RUNNING LOW GRADE FEVER. HE ALSO WANTS TO KNOW IF HE NEEDS TO COME IN FOR LABS. PATIENT STATES HE NEEDS TO GET BACK TO WORK.

## 2020-10-15 ENCOUNTER — OFFICE VISIT (OUTPATIENT)
Dept: UROLOGY | Facility: CLINIC | Age: 70
End: 2020-10-15

## 2020-10-15 VITALS — TEMPERATURE: 98.2 F | BODY MASS INDEX: 26.6 KG/M2 | HEIGHT: 71 IN | WEIGHT: 190.04 LBS

## 2020-10-15 DIAGNOSIS — E29.1 HYPOGONADISM IN MALE: ICD-10-CM

## 2020-10-15 DIAGNOSIS — N42.9 DISORDER OF PROSTATE: ICD-10-CM

## 2020-10-15 DIAGNOSIS — N52.9 ERECTILE DYSFUNCTION, UNSPECIFIED ERECTILE DYSFUNCTION TYPE: ICD-10-CM

## 2020-10-15 DIAGNOSIS — R79.89 LOW TESTOSTERONE IN MALE: Primary | ICD-10-CM

## 2020-10-15 PROCEDURE — 99213 OFFICE O/P EST LOW 20 MIN: CPT | Performed by: UROLOGY

## 2020-10-15 PROCEDURE — 84153 ASSAY OF PSA TOTAL: CPT | Performed by: UROLOGY

## 2020-10-15 PROCEDURE — 84403 ASSAY OF TOTAL TESTOSTERONE: CPT | Performed by: UROLOGY

## 2020-10-15 PROCEDURE — 85027 COMPLETE CBC AUTOMATED: CPT | Performed by: UROLOGY

## 2020-10-15 RX ORDER — SILDENAFIL CITRATE 20 MG/1
TABLET ORAL
Qty: 60 TABLET | Refills: 10 | Status: SHIPPED | OUTPATIENT
Start: 2020-10-15 | End: 2021-10-25

## 2020-10-15 RX ORDER — ANASTROZOLE 1 MG/1
1 TABLET ORAL WEEKLY
Qty: 12 TABLET | Refills: 2 | Status: SHIPPED | OUTPATIENT
Start: 2020-10-15

## 2020-10-15 RX ORDER — TESTOSTERONE CYPIONATE 200 MG/ML
INJECTION, SOLUTION INTRAMUSCULAR
Qty: 10 ML | Refills: 2 | Status: SHIPPED | OUTPATIENT
Start: 2020-10-15 | End: 2021-05-06 | Stop reason: SDUPTHER

## 2020-10-15 NOTE — PROGRESS NOTES
"Chief Complaint:          Chief Complaint   Patient presents with   • LOW TESTOSTERONE       HPI:   69 y.o. male patient returns today for follow-up.  He is been on testosterone replacement therapy.  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  This is a validated questionnaire that was performed on a set of 314 [place] male physicians when it was positive it correlated directly with a 94% chance of low testosterone.  Patient indicates there is a decrease in libido or sex drive, a lack of energy, Decreased  strength and endurance, a decreased \"enjoyment of life\", sad and grumpy feelings with significant difficulty maintaining erections.  He is also been a recent deterioration regarding work performance.  He reports weight loss.  He has good facility and the use of subcutaneous and intramuscular injections as well as comfort level and using the medication in a sterile fashion.  He understands he should use only the prescribed dose.  He's here for appropriate lab monitoring regarding this.  He understands this is a controlled substance and therefore must be watched closely will not be refilled and the medical loss or miss calculation of the dose.  He is very happy with the treatment and therefore wants to continue it.      Past Medical History:        Past Medical History:   Diagnosis Date   • Arthritis    • Dyslipidemia    • Fatigue    • Hyperlipidemia    • Lower back pain    • Lumbar strain    • Myalgia    • Rib pain on right side    • Rotator cuff injury 1993    bucked of horse         Current Meds:     Current Outpatient Medications   Medication Sig Dispense Refill   • anastrozole (ARIMIDEX) 1 MG tablet Take 1 tablet by mouth 1 (One) Time Per Week. 12 tablet 2   • methocarbamol (ROBAXIN) 750 MG tablet Take 1 tablet by mouth 2 (Two) Times a Day. 60 " tablet 5   • naproxen (NAPROSYN) 500 MG tablet Take 1 tablet by mouth 2 (Two) Times a Day With Meals. 60 tablet 0   • sildenafil (REVATIO) 20 MG tablet Take 1 to 5 tablets by mouth one hour prior to intercourse on an empty stomach 24 tablet 10   • Syringe, Disposable, 3 ML misc Use 3 ml syringe with a 25 gauge  5/8 inch needle 24 each 6   • Testosterone Cypionate (Depo-Testosterone) 200 MG/ML injection He is to use 1/2 cc every Monday and Thursday SQ 10 mL 2     No current facility-administered medications for this visit.         Allergies:      Allergies   Allergen Reactions   • Sulfa Antibiotics         Past Surgical History:     History reviewed. No pertinent surgical history.      Social History:     Social History     Socioeconomic History   • Marital status:      Spouse name: Not on file   • Number of children: Not on file   • Years of education: Not on file   • Highest education level: Not on file   Occupational History   • Occupation:    Tobacco Use   • Smoking status: Never Smoker   • Smokeless tobacco: Current User     Types: Snuff   Substance and Sexual Activity   • Alcohol use: No   • Drug use: No   • Sexual activity: Defer       Family History:     Family History   Family history unknown: Yes       Review of Systems:     Review of Systems   Constitutional: Negative.    HENT: Negative.    Eyes: Negative.    Respiratory: Negative.    Cardiovascular: Negative.    Gastrointestinal: Negative.    Endocrine: Negative.    Musculoskeletal: Negative.    Allergic/Immunologic: Negative.    Neurological: Negative.    Hematological: Negative.    Psychiatric/Behavioral: Negative.        Physical Exam:     Physical Exam  Vitals signs and nursing note reviewed.   Constitutional:       Appearance: He is well-developed.   HENT:      Head: Normocephalic and atraumatic.   Eyes:      Conjunctiva/sclera: Conjunctivae normal.      Pupils: Pupils are equal, round, and reactive to light.   Neck:       Musculoskeletal: Normal range of motion.   Cardiovascular:      Rate and Rhythm: Normal rate and regular rhythm.      Heart sounds: Normal heart sounds.   Pulmonary:      Effort: Pulmonary effort is normal.      Breath sounds: Normal breath sounds.   Abdominal:      General: Bowel sounds are normal.      Palpations: Abdomen is soft.   Musculoskeletal: Normal range of motion.   Skin:     General: Skin is warm and dry.   Neurological:      Mental Status: He is alert and oriented to person, place, and time.      Deep Tendon Reflexes: Reflexes are normal and symmetric.   Psychiatric:         Behavior: Behavior normal.         Thought Content: Thought content normal.         Judgment: Judgment normal.         I have reviewed the following portions of the patient's history: allergies, current medications, past family history, past medical history, past social history, past surgical history, problem list and ROS and confirm it's accurate.      Procedure:       Assessment/Plan:   Low testosterone:  patient is here for follow-up.  Since beginning the medication he's been very pleased.  He reports a dramatic improvement in his erections, ability to achieve and maintain an erection, improvement in libido, increase in frequency of morning erections, a noticeable weight loss consistent with the treatment.  No development of breast problems or abnormalities.  He's going to have appropriate safety laboratory parameters checked.   He understands that the new data implicates testosterone with the development of prostate cancer and this is all but been disproven and the medical literature as well as the risks of cardiovascular disease which is actually also been disproven.  He understands that while he is a candidate for topical therapy if he is in contact with children this is not an option because it's been shown to accentuate genitalia development at an early age that this frequently irreversible.  He also understands this is a  controlled substance and as such will not be prescribed without appropriate follow-up and appropriate laboratory investigation.  He understands effects on spermatogenesis including the fact that this is not always completely reversible and not always completely limited his ability to father a child.  He has demonstrated facility in the technique of both intramuscular and subcutaneous injection.  And has been taught sterility one drawing up the medication.            Patient's Body mass index is 26.52 kg/m². BMI is above normal parameters. Recommendations include: educational material.              This document has been electronically signed by OVIDIO ESTEVEZ MD October 15, 2020 12:58 EDT

## 2020-10-16 LAB
DEPRECATED RDW RBC AUTO: 40.2 FL (ref 37–54)
ERYTHROCYTE [DISTWIDTH] IN BLOOD BY AUTOMATED COUNT: 11.8 % (ref 12.3–15.4)
HCT VFR BLD AUTO: 43.2 % (ref 37.5–51)
HGB BLD-MCNC: 14.4 G/DL (ref 13–17.7)
MCH RBC QN AUTO: 30.7 PG (ref 26.6–33)
MCHC RBC AUTO-ENTMCNC: 33.3 G/DL (ref 31.5–35.7)
MCV RBC AUTO: 92.1 FL (ref 79–97)
PLATELET # BLD AUTO: 239 10*3/MM3 (ref 140–450)
PMV BLD AUTO: 11.9 FL (ref 6–12)
PSA SERPL-MCNC: 0.8 NG/ML (ref 0–4)
RBC # BLD AUTO: 4.69 10*6/MM3 (ref 4.14–5.8)
TESTOST SERPL-MCNC: >1500 NG/DL (ref 193–740)
WBC # BLD AUTO: 8.13 10*3/MM3 (ref 3.4–10.8)

## 2020-12-15 ENCOUNTER — OFFICE VISIT (OUTPATIENT)
Dept: UROLOGY | Facility: CLINIC | Age: 70
End: 2020-12-15

## 2020-12-15 VITALS — HEIGHT: 71 IN | BODY MASS INDEX: 26.6 KG/M2 | WEIGHT: 190 LBS | TEMPERATURE: 98.8 F

## 2020-12-15 DIAGNOSIS — N40.1 URINARY RETENTION DUE TO BENIGN PROSTATIC HYPERPLASIA: Primary | ICD-10-CM

## 2020-12-15 DIAGNOSIS — R33.8 URINARY RETENTION DUE TO BENIGN PROSTATIC HYPERPLASIA: Primary | ICD-10-CM

## 2020-12-15 PROCEDURE — 99213 OFFICE O/P EST LOW 20 MIN: CPT | Performed by: UROLOGY

## 2020-12-15 RX ORDER — TAMSULOSIN HYDROCHLORIDE 0.4 MG/1
1 CAPSULE ORAL 2 TIMES DAILY
Qty: 60 CAPSULE | Refills: 5 | Status: SHIPPED | OUTPATIENT
Start: 2020-12-15

## 2020-12-15 NOTE — PROGRESS NOTES
Chief Complaint:          Chief Complaint   Patient presents with   • Urinary Retention       HPI:   69 y.o. male very well-known to me had medication for sinus infection which was an alpha agonist causing urinary retention went to the emergency room got a catheter placed he returns today I placed him on Flomax removed his catheter he will be fine      Past Medical History:        Past Medical History:   Diagnosis Date   • Arthritis    • Dyslipidemia    • Fatigue    • Hyperlipidemia    • Lower back pain    • Lumbar strain    • Myalgia    • Rib pain on right side    • Rotator cuff injury 1993    bucked of horse         Current Meds:     Current Outpatient Medications   Medication Sig Dispense Refill   • anastrozole (ARIMIDEX) 1 MG tablet Take 1 tablet by mouth 1 (One) Time Per Week. 12 tablet 2   • methocarbamol (ROBAXIN) 750 MG tablet Take 1 tablet by mouth 2 (Two) Times a Day. 60 tablet 5   • naproxen (NAPROSYN) 500 MG tablet Take 1 tablet by mouth 2 (Two) Times a Day With Meals. 60 tablet 0   • sildenafil (REVATIO) 20 MG tablet Take 1 to 5 tablets by mouth one hour prior to intercourse on an empty stomach 60 tablet 10   • Syringe, Disposable, 3 ML misc Use 3 ml syringe with a 25 gauge  5/8 inch needle 24 each 6   • Testosterone Cypionate (Depo-Testosterone) 200 MG/ML injection He is to use 1/2 cc every Monday and Thursday SQ 10 mL 2     No current facility-administered medications for this visit.         Allergies:      Allergies   Allergen Reactions   • Sulfa Antibiotics         Past Surgical History:     No past surgical history on file.      Social History:     Social History     Socioeconomic History   • Marital status:      Spouse name: Not on file   • Number of children: Not on file   • Years of education: Not on file   • Highest education level: Not on file   Occupational History   • Occupation:    Tobacco Use   • Smoking status: Never Smoker   • Smokeless tobacco: Current User     Types:  Snuff   Substance and Sexual Activity   • Alcohol use: No   • Drug use: No   • Sexual activity: Defer       Family History:     Family History   Family history unknown: Yes       Review of Systems:     Review of Systems   Constitutional: Negative.    HENT: Negative.    Eyes: Negative.    Respiratory: Negative.    Cardiovascular: Negative.    Gastrointestinal: Negative.    Endocrine: Negative.    Musculoskeletal: Negative.    Allergic/Immunologic: Negative.    Neurological: Negative.    Hematological: Negative.    Psychiatric/Behavioral: Negative.        Physical Exam:     Physical Exam  Vitals signs and nursing note reviewed.   Constitutional:       Appearance: He is well-developed.   HENT:      Head: Normocephalic and atraumatic.   Eyes:      Conjunctiva/sclera: Conjunctivae normal.      Pupils: Pupils are equal, round, and reactive to light.   Neck:      Musculoskeletal: Normal range of motion.   Cardiovascular:      Rate and Rhythm: Normal rate and regular rhythm.      Heart sounds: Normal heart sounds.   Pulmonary:      Effort: Pulmonary effort is normal.      Breath sounds: Normal breath sounds.   Abdominal:      General: Bowel sounds are normal.      Palpations: Abdomen is soft.   Musculoskeletal: Normal range of motion.   Skin:     General: Skin is warm and dry.   Neurological:      Mental Status: He is alert and oriented to person, place, and time.      Deep Tendon Reflexes: Reflexes are normal and symmetric.   Psychiatric:         Behavior: Behavior normal.         Thought Content: Thought content normal.         Judgment: Judgment normal.         I have reviewed the following portions of the patient's history: allergies, current medications, past family history, past medical history, past social history, past surgical history, problem list and ROS and confirm it's accurate.      Procedure:       Assessment/Plan:   Urinary retention due to BPH.  Exacerbated by an alpha agonist.  Currently doing quite well  and remove his catheter placed on twice daily Flomax we will see him back in 48 hours to recheck a residual            Patient's Body mass index is 26.51 kg/m². BMI is above normal parameters. Recommendations include: educational material.              This document has been electronically signed by OVIDIO ESTEVEZ MD December 15, 2020 08:24 EST

## 2020-12-16 PROBLEM — N40.1 URINARY RETENTION DUE TO BENIGN PROSTATIC HYPERPLASIA: Status: ACTIVE | Noted: 2020-12-16

## 2020-12-16 PROBLEM — R33.8 URINARY RETENTION DUE TO BENIGN PROSTATIC HYPERPLASIA: Status: ACTIVE | Noted: 2020-12-16

## 2020-12-17 ENCOUNTER — OFFICE VISIT (OUTPATIENT)
Dept: UROLOGY | Facility: CLINIC | Age: 70
End: 2020-12-17

## 2020-12-17 VITALS — HEIGHT: 71 IN | TEMPERATURE: 98.1 F | WEIGHT: 190.04 LBS | BODY MASS INDEX: 26.6 KG/M2

## 2020-12-17 DIAGNOSIS — R33.8 URINARY RETENTION DUE TO BENIGN PROSTATIC HYPERPLASIA: Primary | ICD-10-CM

## 2020-12-17 DIAGNOSIS — N40.1 URINARY RETENTION DUE TO BENIGN PROSTATIC HYPERPLASIA: Primary | ICD-10-CM

## 2020-12-17 PROCEDURE — 99213 OFFICE O/P EST LOW 20 MIN: CPT | Performed by: UROLOGY

## 2020-12-17 PROCEDURE — 51798 US URINE CAPACITY MEASURE: CPT | Performed by: UROLOGY

## 2020-12-17 RX ORDER — FINASTERIDE 5 MG/1
5 TABLET, FILM COATED ORAL DAILY
Qty: 30 TABLET | Refills: 5 | Status: SHIPPED | OUTPATIENT
Start: 2020-12-17 | End: 2021-05-06 | Stop reason: SDUPTHER

## 2020-12-17 NOTE — PROGRESS NOTES
Chief Complaint:          Chief Complaint   Patient presents with   • Benign Prostatic Hypertrophy       HPI:   69 y.o. male who went into urinary retention after he took a decongestant for sinus problems.  Catheter is out he is taking twice daily Flomax he still has frequency is post voids 80 cc I Carola add finasteride to his current regimen I will see him back in 3 weeks      Past Medical History:        Past Medical History:   Diagnosis Date   • Arthritis    • Dyslipidemia    • Fatigue    • Hyperlipidemia    • Lower back pain    • Lumbar strain    • Myalgia    • Rib pain on right side    • Rotator cuff injury 1993    bucked of horse         Current Meds:     Current Outpatient Medications   Medication Sig Dispense Refill   • anastrozole (ARIMIDEX) 1 MG tablet Take 1 tablet by mouth 1 (One) Time Per Week. 12 tablet 2   • methocarbamol (ROBAXIN) 750 MG tablet Take 1 tablet by mouth 2 (Two) Times a Day. 60 tablet 5   • naproxen (NAPROSYN) 500 MG tablet Take 1 tablet by mouth 2 (Two) Times a Day With Meals. 60 tablet 0   • sildenafil (REVATIO) 20 MG tablet Take 1 to 5 tablets by mouth one hour prior to intercourse on an empty stomach 60 tablet 10   • Syringe, Disposable, 3 ML misc Use 3 ml syringe with a 25 gauge  5/8 inch needle 24 each 6   • tamsulosin (Flomax) 0.4 MG capsule 24 hr capsule Take 1 capsule by mouth 2 (two) times a day. 60 capsule 5   • Testosterone Cypionate (Depo-Testosterone) 200 MG/ML injection He is to use 1/2 cc every Monday and Thursday SQ 10 mL 2     No current facility-administered medications for this visit.         Allergies:      Allergies   Allergen Reactions   • Sulfa Antibiotics         Past Surgical History:     History reviewed. No pertinent surgical history.      Social History:     Social History     Socioeconomic History   • Marital status:      Spouse name: Not on file   • Number of children: Not on file   • Years of education: Not on file   • Highest education level: Not  on file   Occupational History   • Occupation:    Tobacco Use   • Smoking status: Never Smoker   • Smokeless tobacco: Current User     Types: Snuff   Substance and Sexual Activity   • Alcohol use: No   • Drug use: No   • Sexual activity: Defer       Family History:     Family History   Family history unknown: Yes       Review of Systems:     Review of Systems   Genitourinary: Positive for frequency.       Physical Exam:     Physical Exam  Vitals signs and nursing note reviewed.   Constitutional:       Appearance: He is well-developed.   HENT:      Head: Normocephalic and atraumatic.   Eyes:      Conjunctiva/sclera: Conjunctivae normal.      Pupils: Pupils are equal, round, and reactive to light.   Neck:      Musculoskeletal: Normal range of motion.   Cardiovascular:      Rate and Rhythm: Normal rate and regular rhythm.      Heart sounds: Normal heart sounds.   Pulmonary:      Effort: Pulmonary effort is normal.      Breath sounds: Normal breath sounds.   Abdominal:      General: Bowel sounds are normal.      Palpations: Abdomen is soft.   Musculoskeletal: Normal range of motion.   Skin:     General: Skin is warm and dry.   Neurological:      Mental Status: He is alert and oriented to person, place, and time.      Deep Tendon Reflexes: Reflexes are normal and symmetric.   Psychiatric:         Behavior: Behavior normal.         Thought Content: Thought content normal.         Judgment: Judgment normal.         I have reviewed the following portions of the patient's history: allergies, current medications, past family history, past medical history, past social history, past surgical history, problem list and ROS and confirm it's accurate.      Procedure:       Assessment/Plan:   Urinary retention secondary to BPH exacerbated by an alpha agonist he is currently on twice daily Flomax doing better still has frequency him to make the addition of finasteride            Patient's Body mass index is 26.52 kg/m².  BMI is above normal parameters. Recommendations include: educational material.              This document has been electronically signed by OVIDIO ESTEVEZ MD December 17, 2020 08:30 EST

## 2021-01-14 ENCOUNTER — OFFICE VISIT (OUTPATIENT)
Dept: UROLOGY | Facility: CLINIC | Age: 71
End: 2021-01-14

## 2021-01-14 VITALS — HEIGHT: 71 IN | BODY MASS INDEX: 26.6 KG/M2 | TEMPERATURE: 98.8 F | WEIGHT: 190 LBS

## 2021-01-14 DIAGNOSIS — N42.9 DISORDER OF PROSTATE: ICD-10-CM

## 2021-01-14 DIAGNOSIS — N40.1 URINARY RETENTION DUE TO BENIGN PROSTATIC HYPERPLASIA: Primary | ICD-10-CM

## 2021-01-14 DIAGNOSIS — R33.8 URINARY RETENTION DUE TO BENIGN PROSTATIC HYPERPLASIA: Primary | ICD-10-CM

## 2021-01-14 DIAGNOSIS — R79.89 LOW TESTOSTERONE IN MALE: ICD-10-CM

## 2021-01-14 LAB
ANION GAP SERPL CALCULATED.3IONS-SCNC: 8.8 MMOL/L (ref 5–15)
BILIRUB BLD-MCNC: NEGATIVE MG/DL
BUN SERPL-MCNC: 26 MG/DL (ref 8–23)
BUN/CREAT SERPL: 25 (ref 7–25)
CALCIUM SPEC-SCNC: 9.4 MG/DL (ref 8.6–10.5)
CHLORIDE SERPL-SCNC: 101 MMOL/L (ref 98–107)
CLARITY, POC: CLEAR
CO2 SERPL-SCNC: 27.2 MMOL/L (ref 22–29)
COLOR UR: YELLOW
CREAT SERPL-MCNC: 1.04 MG/DL (ref 0.76–1.27)
DEPRECATED RDW RBC AUTO: 39 FL (ref 37–54)
ERYTHROCYTE [DISTWIDTH] IN BLOOD BY AUTOMATED COUNT: 11.9 % (ref 12.3–15.4)
GFR SERPL CREATININE-BSD FRML MDRD: 71 ML/MIN/1.73
GLUCOSE SERPL-MCNC: 65 MG/DL (ref 65–99)
GLUCOSE UR STRIP-MCNC: NEGATIVE MG/DL
HCT VFR BLD AUTO: 39.9 % (ref 37.5–51)
HGB BLD-MCNC: 13.5 G/DL (ref 13–17.7)
KETONES UR QL: NEGATIVE
LEUKOCYTE EST, POC: NEGATIVE
MCH RBC QN AUTO: 30.1 PG (ref 26.6–33)
MCHC RBC AUTO-ENTMCNC: 33.8 G/DL (ref 31.5–35.7)
MCV RBC AUTO: 89.1 FL (ref 79–97)
NITRITE UR-MCNC: NEGATIVE MG/ML
PH UR: 6 [PH] (ref 5–8)
PLATELET # BLD AUTO: 257 10*3/MM3 (ref 140–450)
PMV BLD AUTO: 11.1 FL (ref 6–12)
POTASSIUM SERPL-SCNC: 4.2 MMOL/L (ref 3.5–5.2)
PROT UR STRIP-MCNC: NEGATIVE MG/DL
RBC # BLD AUTO: 4.48 10*6/MM3 (ref 4.14–5.8)
RBC # UR STRIP: NEGATIVE /UL
SODIUM SERPL-SCNC: 137 MMOL/L (ref 136–145)
SP GR UR: 1.02 (ref 1–1.03)
UROBILINOGEN UR QL: NORMAL
WBC # BLD AUTO: 20.48 10*3/MM3 (ref 3.4–10.8)

## 2021-01-14 PROCEDURE — 85027 COMPLETE CBC AUTOMATED: CPT | Performed by: UROLOGY

## 2021-01-14 PROCEDURE — 80048 BASIC METABOLIC PNL TOTAL CA: CPT | Performed by: UROLOGY

## 2021-01-14 PROCEDURE — 87086 URINE CULTURE/COLONY COUNT: CPT | Performed by: UROLOGY

## 2021-01-14 PROCEDURE — 81003 URINALYSIS AUTO W/O SCOPE: CPT | Performed by: UROLOGY

## 2021-01-14 PROCEDURE — 99213 OFFICE O/P EST LOW 20 MIN: CPT | Performed by: UROLOGY

## 2021-01-14 RX ORDER — CIPROFLOXACIN 500 MG/1
500 TABLET, FILM COATED ORAL 2 TIMES DAILY
Qty: 28 TABLET | Refills: 0 | Status: SHIPPED | OUTPATIENT
Start: 2021-01-14 | End: 2021-11-12

## 2021-01-14 RX ORDER — NAPROXEN 500 MG/1
500 TABLET ORAL 2 TIMES DAILY WITH MEALS
Qty: 90 TABLET | Refills: 3 | Status: SHIPPED | OUTPATIENT
Start: 2021-01-14 | End: 2022-10-13 | Stop reason: SDUPTHER

## 2021-01-14 NOTE — PROGRESS NOTES
Chief Complaint:          Chief Complaint   Patient presents with   • Benign Prostatic Hypertrophy       HPI:   70 y.o. male returns today.  He thinks he has a prostate infection.  He has been getting up twice at night with significant night sweats, he is Covid negative, he has allergies to sulfa, he does not have a lot of significant symptoms but he feels its prostate related.  He had a small prostate at less than 10 g.  I will go ahead and start him on an empiric antibiotic, check a BMP and a CBC and see where his white count is.  I put him on Cipro and I will see him back on Tuesday, January 19 for follow-up.      Past Medical History:        Past Medical History:   Diagnosis Date   • Arthritis    • Dyslipidemia    • Fatigue    • Hyperlipidemia    • Lower back pain    • Lumbar strain    • Myalgia    • Rib pain on right side    • Rotator cuff injury 1993    bucked of horse         Current Meds:     Current Outpatient Medications   Medication Sig Dispense Refill   • anastrozole (ARIMIDEX) 1 MG tablet Take 1 tablet by mouth 1 (One) Time Per Week. 12 tablet 2   • finasteride (PROSCAR) 5 MG tablet Take 1 tablet by mouth Daily. 30 tablet 5   • methocarbamol (ROBAXIN) 750 MG tablet Take 1 tablet by mouth 2 (Two) Times a Day. 60 tablet 5   • naproxen (NAPROSYN) 500 MG tablet Take 1 tablet by mouth 2 (Two) Times a Day With Meals. 60 tablet 0   • sildenafil (REVATIO) 20 MG tablet Take 1 to 5 tablets by mouth one hour prior to intercourse on an empty stomach 60 tablet 10   • Syringe, Disposable, 3 ML misc Use 3 ml syringe with a 25 gauge  5/8 inch needle 24 each 6   • tamsulosin (Flomax) 0.4 MG capsule 24 hr capsule Take 1 capsule by mouth 2 (two) times a day. 60 capsule 5   • Testosterone Cypionate (Depo-Testosterone) 200 MG/ML injection He is to use 1/2 cc every Monday and Thursday SQ 10 mL 2     No current facility-administered medications for this visit.         Allergies:      Allergies   Allergen Reactions   • Sulfa  Antibiotics         Past Surgical History:     No past surgical history on file.      Social History:     Social History     Socioeconomic History   • Marital status:      Spouse name: Not on file   • Number of children: Not on file   • Years of education: Not on file   • Highest education level: Not on file   Occupational History   • Occupation:    Tobacco Use   • Smoking status: Never Smoker   • Smokeless tobacco: Current User     Types: Snuff   Substance and Sexual Activity   • Alcohol use: No   • Drug use: No   • Sexual activity: Defer       Family History:     Family History   Family history unknown: Yes       Review of Systems:     Review of Systems   Constitutional: Negative.    HENT: Negative.    Eyes: Negative.    Respiratory: Negative.    Cardiovascular: Negative.    Gastrointestinal: Negative.    Endocrine: Negative.    Genitourinary: Positive for difficulty urinating, dysuria, frequency and scrotal swelling.   Musculoskeletal: Negative.    Allergic/Immunologic: Negative.    Neurological: Negative.    Hematological: Negative.    Psychiatric/Behavioral: Negative.        Physical Exam:     Physical Exam  Vitals signs and nursing note reviewed.   Constitutional:       Appearance: He is well-developed.   HENT:      Head: Normocephalic and atraumatic.   Eyes:      Conjunctiva/sclera: Conjunctivae normal.      Pupils: Pupils are equal, round, and reactive to light.   Neck:      Musculoskeletal: Normal range of motion.   Cardiovascular:      Rate and Rhythm: Normal rate and regular rhythm.      Heart sounds: Normal heart sounds.   Pulmonary:      Effort: Pulmonary effort is normal.      Breath sounds: Normal breath sounds.   Abdominal:      General: Bowel sounds are normal.      Palpations: Abdomen is soft.   Genitourinary:     Comments: Rectal tone, prostate less than 10 g  Musculoskeletal: Normal range of motion.   Skin:     General: Skin is warm and dry.   Neurological:      Mental Status:  He is alert and oriented to person, place, and time.      Deep Tendon Reflexes: Reflexes are normal and symmetric.   Psychiatric:         Behavior: Behavior normal.         Thought Content: Thought content normal.         Judgment: Judgment normal.         I have reviewed the following portions of the patient's history: allergies, current medications, past family history, past medical history, past social history, past surgical history, problem list and ROS and confirm it's accurate.      Procedure:       Assessment/Plan:   Prostatitis-we discussed the differential diagnosis of prostatitis including the National Institutes of Health classification system I through IV.  I discussed that type I prostatitis is  acute bacterial prostatitis and an associated with high fevers typically hematuria and severe pain with voiding.  We discussed the fact that it necessitates 6 weeks of chronic antibiotics to be sure it doesn't turn into a chronic bacterial prostatitis that can have lifelong ramifications.  We discussed National Institutes of Health type II chronic bacterial prostatitis.  We discussed the fact that this a chronic bacterial infection of the prostate that often requires suppressive antibiotics.  We discussed type III being related more to nonspecific urethritis as well as tight for being related to finding inflammation and a biopsy in the absence of symptomatology.  I discussed the diagnostic strategies including the VB 1 through 4 urine culture and discussed empiric therapy.  I emphasized that with the use of chronic antibiotics I strongly recommended the concomitant use of probiotics.  Additionally, we discussed the various antibiotics used and the risks and benefits associated with each particularly the use of long-term ciprofloxacin and the effect on joints and collagen in human beings.  I will go ahead and check a BMP to evaluate the creatinine, check a CBC and evaluate the white count, I Carola place him on  Cipro is allergic to Septra            Patient's Body mass index is 26.51 kg/m². BMI is above normal parameters. Recommendations include: educational material.              This document has been electronically signed by OVIDIO ESTEVEZ MD January 14, 2021 09:49 EST

## 2021-01-15 PROBLEM — N41.1 CHRONIC PROSTATITIS: Status: ACTIVE | Noted: 2021-01-15

## 2021-01-15 LAB — BACTERIA SPEC AEROBE CULT: NO GROWTH

## 2021-01-19 ENCOUNTER — OFFICE VISIT (OUTPATIENT)
Dept: UROLOGY | Facility: CLINIC | Age: 71
End: 2021-01-19

## 2021-01-19 VITALS — WEIGHT: 190 LBS | HEIGHT: 71 IN | BODY MASS INDEX: 26.6 KG/M2 | TEMPERATURE: 98.2 F

## 2021-01-19 DIAGNOSIS — R79.89 LOW TESTOSTERONE: Primary | ICD-10-CM

## 2021-01-19 DIAGNOSIS — N40.1 URINARY RETENTION DUE TO BENIGN PROSTATIC HYPERPLASIA: ICD-10-CM

## 2021-01-19 DIAGNOSIS — R33.8 URINARY RETENTION DUE TO BENIGN PROSTATIC HYPERPLASIA: ICD-10-CM

## 2021-01-19 DIAGNOSIS — N41.1 CHRONIC PROSTATITIS: ICD-10-CM

## 2021-01-19 PROCEDURE — 99213 OFFICE O/P EST LOW 20 MIN: CPT | Performed by: UROLOGY

## 2021-01-19 NOTE — PROGRESS NOTES
Chief Complaint:          Chief Complaint   Patient presents with   • Benign Prostatic Hypertrophy       HPI:   70 y.o. male was currently on testosterone therapy doing fantastic he developed a fever felt terrible had a white count dramatically elevated.  Had a PSA of 2.0 his urine culture was sterile his white count was 20,400 his creatinine was 1.04.  His PSA performed on December 31, 2020 was 2.0.  He is dramatically better I reviewed his labs provided to me.  Overall, he is dramatically better he feels good he had a blood culture with gram-positive cocci noted on January 4, 2021.  Clearly contaminant.  He had a sterile urine culture that was performed on December 30.  He had no other complaints problems he is now dramatically better doing well voiding to completion I will have him complete his alpha blockade and I will follow up with him based on this.  Overall he is done much better I will see him back in March for his continued testosterone replacement therapy.      Past Medical History:        Past Medical History:   Diagnosis Date   • Arthritis    • Dyslipidemia    • Fatigue    • Hyperlipidemia    • Lower back pain    • Lumbar strain    • Myalgia    • Rib pain on right side    • Rotator cuff injury 1993    bucked of horse         Current Meds:     Current Outpatient Medications   Medication Sig Dispense Refill   • anastrozole (ARIMIDEX) 1 MG tablet Take 1 tablet by mouth 1 (One) Time Per Week. 12 tablet 2   • ciprofloxacin (Cipro) 500 MG tablet Take 1 tablet by mouth 2 (Two) Times a Day. 28 tablet 0   • finasteride (PROSCAR) 5 MG tablet Take 1 tablet by mouth Daily. 30 tablet 5   • methocarbamol (ROBAXIN) 750 MG tablet Take 1 tablet by mouth 2 (Two) Times a Day. 60 tablet 5   • naproxen (NAPROSYN) 500 MG tablet Take 1 tablet by mouth 2 (Two) Times a Day With Meals. 90 tablet 3   • sildenafil (REVATIO) 20 MG tablet Take 1 to 5 tablets by mouth one hour prior to intercourse on an empty stomach 60 tablet 10   •  Syringe, Disposable, 3 ML misc Use 3 ml syringe with a 25 gauge  5/8 inch needle 24 each 6   • tamsulosin (Flomax) 0.4 MG capsule 24 hr capsule Take 1 capsule by mouth 2 (two) times a day. 60 capsule 5   • Testosterone Cypionate (Depo-Testosterone) 200 MG/ML injection He is to use 1/2 cc every Monday and Thursday SQ 10 mL 2     No current facility-administered medications for this visit.         Allergies:      Allergies   Allergen Reactions   • Sulfa Antibiotics         Past Surgical History:     No past surgical history on file.      Social History:     Social History     Socioeconomic History   • Marital status:      Spouse name: Not on file   • Number of children: Not on file   • Years of education: Not on file   • Highest education level: Not on file   Occupational History   • Occupation:    Tobacco Use   • Smoking status: Never Smoker   • Smokeless tobacco: Current User     Types: Snuff   Substance and Sexual Activity   • Alcohol use: No   • Drug use: No   • Sexual activity: Defer       Family History:     Family History   Family history unknown: Yes       Review of Systems:     Review of Systems   Constitutional: Negative.    HENT: Negative.    Eyes: Negative.    Respiratory: Negative.    Cardiovascular: Negative.    Gastrointestinal: Negative.    Endocrine: Negative.    Musculoskeletal: Negative.    Allergic/Immunologic: Negative.    Neurological: Negative.    Hematological: Negative.    Psychiatric/Behavioral: Negative.        Physical Exam:     Physical Exam  Vitals signs and nursing note reviewed.   Constitutional:       Appearance: He is well-developed.   HENT:      Head: Normocephalic and atraumatic.   Eyes:      Conjunctiva/sclera: Conjunctivae normal.      Pupils: Pupils are equal, round, and reactive to light.   Neck:      Musculoskeletal: Normal range of motion.   Cardiovascular:      Rate and Rhythm: Normal rate and regular rhythm.      Heart sounds: Normal heart sounds.    Pulmonary:      Effort: Pulmonary effort is normal.      Breath sounds: Normal breath sounds.   Abdominal:      General: Bowel sounds are normal.      Palpations: Abdomen is soft.   Musculoskeletal: Normal range of motion.   Skin:     General: Skin is warm and dry.   Neurological:      Mental Status: He is alert and oriented to person, place, and time.      Deep Tendon Reflexes: Reflexes are normal and symmetric.   Psychiatric:         Behavior: Behavior normal.         Thought Content: Thought content normal.         Judgment: Judgment normal.         I have reviewed the following portions of the patient's history: allergies, current medications, past family history, past medical history, past social history, past surgical history, problem list and ROS and confirm it's accurate.      Procedure:       Assessment/Plan:   Prostatitis-we discussed the differential diagnosis of prostatitis including the National Institutes of Health classification system I through IV.  I discussed that type I prostatitis is  acute bacterial prostatitis and an associated with high fevers typically hematuria and severe pain with voiding.  We discussed the fact that it necessitates 6 weeks of chronic antibiotics to be sure it doesn't turn into a chronic bacterial prostatitis that can have lifelong ramifications.  We discussed National Institutes of Health type II chronic bacterial prostatitis.  We discussed the fact that this a chronic bacterial infection of the prostate that often requires suppressive antibiotics.  We discussed type III being related more to nonspecific urethritis as well as tight for being related to finding inflammation and a biopsy in the absence of symptomatology.  I discussed the diagnostic strategies including the VB 1 through 4 urine culture and discussed empiric therapy.  I emphasized that with the use of chronic antibiotics I strongly recommended the concomitant use of probiotics.  Additionally, we discussed  the various antibiotics used and the risks and benefits associated with each particularly the use of long-term ciprofloxacin and the effect on joints and collagen in human beings.  His symptomatology is resolved  Low testosterone:  patient is here for follow-up.  Since beginning the medication he's been very pleased.  He reports a dramatic improvement in his erections, ability to achieve and maintain an erection, improvement in libido, increase in frequency of morning erections, a noticeable weight loss consistent with the treatment.  No development of breast problems or abnormalities.  He's going to have appropriate safety laboratory parameters checked.   He understands that the new data implicates testosterone with the development of prostate cancer and this is all but been disproven and the medical literature as well as the risks of cardiovascular disease which is actually also been disproven.  He understands that while he is a candidate for topical therapy if he is in contact with children this is not an option because it's been shown to accentuate genitalia development at an early age that this frequently irreversible.  He also understands this is a controlled substance and as such will not be prescribed without appropriate follow-up and appropriate laboratory investigation.  He understands effects on spermatogenesis including the fact that this is not always completely reversible and not always completely limited his ability to father a child.  He has demonstrated facility in the technique of both intramuscular and subcutaneous injection.  And has been taught sterility one drawing up the medication.            Patient's Body mass index is 26.51 kg/m². BMI is above normal parameters. Recommendations include: educational material.              This document has been electronically signed by OVIDIO ESTEVEZ MD January 19, 2021 07:57 EST

## 2021-03-12 ENCOUNTER — IMMUNIZATION (OUTPATIENT)
Dept: VACCINE CLINIC | Facility: HOSPITAL | Age: 71
End: 2021-03-12

## 2021-03-12 PROCEDURE — 0001A: CPT | Performed by: INTERNAL MEDICINE

## 2021-03-12 PROCEDURE — 91300 HC SARSCOV02 VAC 30MCG/0.3ML IM: CPT | Performed by: INTERNAL MEDICINE

## 2021-04-02 ENCOUNTER — IMMUNIZATION (OUTPATIENT)
Dept: VACCINE CLINIC | Facility: HOSPITAL | Age: 71
End: 2021-04-02

## 2021-04-02 PROCEDURE — 91300 HC SARSCOV02 VAC 30MCG/0.3ML IM: CPT | Performed by: INTERNAL MEDICINE

## 2021-04-02 PROCEDURE — 0002A: CPT | Performed by: INTERNAL MEDICINE

## 2021-05-06 ENCOUNTER — OFFICE VISIT (OUTPATIENT)
Dept: UROLOGY | Facility: CLINIC | Age: 71
End: 2021-05-06

## 2021-05-06 VITALS — HEIGHT: 71 IN | BODY MASS INDEX: 26.74 KG/M2 | TEMPERATURE: 98.1 F | WEIGHT: 191 LBS

## 2021-05-06 DIAGNOSIS — N62 GYNECOMASTIA, MALE: ICD-10-CM

## 2021-05-06 DIAGNOSIS — R33.8 URINARY RETENTION DUE TO BENIGN PROSTATIC HYPERPLASIA: ICD-10-CM

## 2021-05-06 DIAGNOSIS — N52.02 CORPORO-VENOUS OCCLUSIVE ERECTILE DYSFUNCTION: ICD-10-CM

## 2021-05-06 DIAGNOSIS — N40.1 URINARY RETENTION DUE TO BENIGN PROSTATIC HYPERPLASIA: ICD-10-CM

## 2021-05-06 DIAGNOSIS — R79.89 LOW TESTOSTERONE: ICD-10-CM

## 2021-05-06 DIAGNOSIS — N42.9 DISORDER OF PROSTATE: Primary | ICD-10-CM

## 2021-05-06 DIAGNOSIS — E29.1 HYPOGONADISM IN MALE: ICD-10-CM

## 2021-05-06 PROCEDURE — 99214 OFFICE O/P EST MOD 30 MIN: CPT | Performed by: UROLOGY

## 2021-05-06 PROCEDURE — 82670 ASSAY OF TOTAL ESTRADIOL: CPT | Performed by: UROLOGY

## 2021-05-06 PROCEDURE — 36415 COLL VENOUS BLD VENIPUNCTURE: CPT | Performed by: UROLOGY

## 2021-05-06 PROCEDURE — 84153 ASSAY OF PSA TOTAL: CPT | Performed by: UROLOGY

## 2021-05-06 PROCEDURE — 84403 ASSAY OF TOTAL TESTOSTERONE: CPT | Performed by: UROLOGY

## 2021-05-06 PROCEDURE — 85027 COMPLETE CBC AUTOMATED: CPT | Performed by: UROLOGY

## 2021-05-06 RX ORDER — TESTOSTERONE CYPIONATE 200 MG/ML
INJECTION, SOLUTION INTRAMUSCULAR
Qty: 10 ML | Refills: 2 | Status: SHIPPED | OUTPATIENT
Start: 2021-05-06 | End: 2021-11-12 | Stop reason: SDUPTHER

## 2021-05-06 RX ORDER — FINASTERIDE 5 MG/1
5 TABLET, FILM COATED ORAL DAILY
Qty: 30 TABLET | Refills: 5 | Status: SHIPPED | OUTPATIENT
Start: 2021-05-06

## 2021-05-06 NOTE — PROGRESS NOTES
"Chief Complaint:          Chief Complaint   Patient presents with   • Low Testosterone     follow up        HPI:   70 y.o. male returns today looks fantastic he has gained 10 pounds.  Overall very pleased with how well he is doing.  He continues on finasteride.  Patient returns today for follow-up.  He is been on testosterone replacement therapy.  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  This is a validated questionnaire that was performed on a set of 314 [place] male physicians when it was positive it correlated directly with a 94% chance of low testosterone.  Patient indicates there is a decrease in libido or sex drive, a lack of energy, Decreased  strength and endurance, a decreased \"enjoyment of life\", sad and grumpy feelings with significant difficulty maintaining erections.  He is also been a recent deterioration regarding work performance.  He reports weight loss.  He has good facility and the use of subcutaneous and intramuscular injections as well as comfort level and using the medication in a sterile fashion.  He understands he should use only the prescribed dose.  He's here for appropriate lab monitoring regarding this.  He understands this is a controlled substance and therefore must be watched closely will not be refilled and the medical loss or miss calculation of the dose.  He is very happy with the treatment and therefore wants to continue it.      Past Medical History:        Past Medical History:   Diagnosis Date   • Arthritis    • Dyslipidemia    • Fatigue    • Hyperlipidemia    • Lower back pain    • Lumbar strain    • Myalgia    • Rib pain on right side    • Rotator cuff injury 1993    bucked of horse         Current Meds:     Current Outpatient Medications   Medication Sig Dispense Refill   • anastrozole (ARIMIDEX) 1 MG tablet " Take 1 tablet by mouth 1 (One) Time Per Week. 12 tablet 2   • ciprofloxacin (Cipro) 500 MG tablet Take 1 tablet by mouth 2 (Two) Times a Day. 28 tablet 0   • finasteride (PROSCAR) 5 MG tablet Take 1 tablet by mouth Daily. 30 tablet 5   • methocarbamol (ROBAXIN) 750 MG tablet Take 1 tablet by mouth 2 (Two) Times a Day. 60 tablet 5   • naproxen (NAPROSYN) 500 MG tablet Take 1 tablet by mouth 2 (Two) Times a Day With Meals. 90 tablet 3   • sildenafil (REVATIO) 20 MG tablet Take 1 to 5 tablets by mouth one hour prior to intercourse on an empty stomach 60 tablet 10   • Syringe, Disposable, 3 ML misc Use 3 ml syringe with a 25 gauge  5/8 inch needle 24 each 6   • tamsulosin (Flomax) 0.4 MG capsule 24 hr capsule Take 1 capsule by mouth 2 (two) times a day. 60 capsule 5   • Testosterone Cypionate (Depo-Testosterone) 200 MG/ML injection He is to use 1/2 cc every Monday and Thursday SQ 10 mL 2     No current facility-administered medications for this visit.        Allergies:      Allergies   Allergen Reactions   • Sulfa Antibiotics         Past Surgical History:     History reviewed. No pertinent surgical history.      Social History:     Social History     Socioeconomic History   • Marital status:      Spouse name: Not on file   • Number of children: Not on file   • Years of education: Not on file   • Highest education level: Not on file   Tobacco Use   • Smoking status: Never Smoker   • Smokeless tobacco: Current User     Types: Snuff   Substance and Sexual Activity   • Alcohol use: No   • Drug use: No   • Sexual activity: Defer       Family History:     Family History   Family history unknown: Yes       Review of Systems:     Review of Systems   Constitutional: Negative.    HENT: Negative.    Eyes: Negative.    Respiratory: Negative.    Cardiovascular: Negative.    Gastrointestinal: Negative.    Endocrine: Negative.    Musculoskeletal: Negative.    Allergic/Immunologic: Negative.    Neurological: Negative.     Hematological: Negative.    Psychiatric/Behavioral: Negative.        Physical Exam:     Physical Exam  Vitals and nursing note reviewed.   Constitutional:       Appearance: He is well-developed.   HENT:      Head: Normocephalic and atraumatic.   Eyes:      Conjunctiva/sclera: Conjunctivae normal.      Pupils: Pupils are equal, round, and reactive to light.   Cardiovascular:      Rate and Rhythm: Normal rate and regular rhythm.      Heart sounds: Normal heart sounds.   Pulmonary:      Effort: Pulmonary effort is normal.      Breath sounds: Normal breath sounds.   Abdominal:      General: Bowel sounds are normal.      Palpations: Abdomen is soft.   Musculoskeletal:         General: Normal range of motion.      Cervical back: Normal range of motion.   Skin:     General: Skin is warm and dry.   Neurological:      Mental Status: He is alert and oriented to person, place, and time.      Deep Tendon Reflexes: Reflexes are normal and symmetric.   Psychiatric:         Behavior: Behavior normal.         Thought Content: Thought content normal.         Judgment: Judgment normal.         I have reviewed the following portions of the patient's history: allergies, current medications, past family history, past medical history, past social history, past surgical history, problem list and ROS and confirm it's accurate.      Procedure:       Assessment/Plan:   Low testosterone:  patient is here for follow-up.  Since beginning the medication he's been very pleased.  He reports a dramatic improvement in his erections, ability to achieve and maintain an erection, improvement in libido, increase in frequency of morning erections, a noticeable weight loss consistent with the treatment.  No development of breast problems or abnormalities.  He's going to have appropriate safety laboratory parameters checked.   He understands that the new data implicates testosterone with the development of prostate cancer and this is all but been  disproven and the medical literature as well as the risks of cardiovascular disease which is actually also been disproven.  He understands that while he is a candidate for topical therapy if he is in contact with children this is not an option because it's been shown to accentuate genitalia development at an early age that this frequently irreversible.  He also understands this is a controlled substance and as such will not be prescribed without appropriate follow-up and appropriate laboratory investigation.  He understands effects on spermatogenesis including the fact that this is not always completely reversible and not always completely limited his ability to father a child.  He has demonstrated facility in the technique of both intramuscular and subcutaneous injection.  And has been taught sterility one drawing up the medication.  Erectile dysfunction-we discussed the anatomy and physiology of the penis and the endothelium.  We discussed the various forms of erectile dysfunction including peripheral vascular occlusive disease, postoperative, secondary to radiation treatments of the prostate, and arterial inflow.  We discussed the various treatment options available including oral medication and its various forms.  We discussed the use of both generic and non-generic Viagra.  We discussed Cialis and a longer half-life of 17 hours as well as the other 2 medications.  We discussed cost involved with this including the fact that the generic is much cheaper but is taken has multiple pills because they are 20 mg dosages.  We did discuss the other alternatives including Penile injections, vacuum erection devices and surgical intervention reserved for only the most severe cases.  We discussed the need for testosterone in about 20% of cases of erectile dysfunction.  Continue sildenafil  Hyperestrogenism-we spoke about the role of estrogen metabolism and breakdown in the  presence of testosterone replacement therapy.  We  spoke about how high estradiol levels can interfere with the improvement noted in a man on testosterone as well as significant side effects such as pseudogynecomastia.  We discussed the use of the medication arimidex using a very judicious low-dose fashion to prevent too low of an estradiol which would precipitate bone complications.  Going to check an estradiol level  Polycythemia-I am going to check a CBC to rule out hemoglobin changes.  We utilized the American Heart Association guidelines for polycythemia which is a hemoglobin greater than 18 and a hematocrit greater than 54.5.  Recommend therapeutic phlebotomy as the treatment.  It is important that we indicate that is the most likely cause of the polycythemia.  We also discussed the possibility of decreasing the dose of testosterone.                    This document has been electronically signed by OVIDIO ESTEVEZ MD May 6, 2021 15:38 EDT

## 2021-05-07 LAB
DEPRECATED RDW RBC AUTO: 41 FL (ref 37–54)
ERYTHROCYTE [DISTWIDTH] IN BLOOD BY AUTOMATED COUNT: 12.1 % (ref 12.3–15.4)
ESTRADIOL SERPL HS-MCNC: 120 PG/ML
HCT VFR BLD AUTO: 44.5 % (ref 37.5–51)
HGB BLD-MCNC: 14.9 G/DL (ref 13–17.7)
MCH RBC QN AUTO: 31 PG (ref 26.6–33)
MCHC RBC AUTO-ENTMCNC: 33.5 G/DL (ref 31.5–35.7)
MCV RBC AUTO: 92.5 FL (ref 79–97)
PLATELET # BLD AUTO: 208 10*3/MM3 (ref 140–450)
PMV BLD AUTO: 11.8 FL (ref 6–12)
PSA SERPL-MCNC: 0.98 NG/ML (ref 0–4)
RBC # BLD AUTO: 4.81 10*6/MM3 (ref 4.14–5.8)
TESTOST SERPL-MCNC: >1500 NG/DL (ref 193–740)
WBC # BLD AUTO: 8.23 10*3/MM3 (ref 3.4–10.8)

## 2021-05-12 DIAGNOSIS — E29.1 HYPOGONADISM IN MALE: Primary | ICD-10-CM

## 2021-05-12 NOTE — TELEPHONE ENCOUNTER
I called the pt and let him know that his labs overall were great. However, we did notice that his estradiol level was elevated and what we recommend for this is a round of armidex it is a 1 mg tablet you take weekly for 12 weeks and then when retested see if you need another round. 3 refills given in case of that and I confirmed his pharmacy.

## 2021-05-13 RX ORDER — ANASTROZOLE 1 MG/1
1 TABLET ORAL WEEKLY
Qty: 12 TABLET | Refills: 2 | Status: SHIPPED | OUTPATIENT
Start: 2021-05-13 | End: 2022-04-21

## 2021-10-24 DIAGNOSIS — N52.9 ERECTILE DYSFUNCTION, UNSPECIFIED ERECTILE DYSFUNCTION TYPE: ICD-10-CM

## 2021-10-25 RX ORDER — SILDENAFIL CITRATE 20 MG/1
TABLET ORAL
Qty: 60 TABLET | Refills: 10 | Status: SHIPPED | OUTPATIENT
Start: 2021-10-25 | End: 2021-11-12 | Stop reason: SDUPTHER

## 2021-11-12 ENCOUNTER — OFFICE VISIT (OUTPATIENT)
Dept: UROLOGY | Facility: CLINIC | Age: 71
End: 2021-11-12

## 2021-11-12 VITALS — HEIGHT: 71 IN | BODY MASS INDEX: 26.88 KG/M2 | WEIGHT: 192 LBS

## 2021-11-12 DIAGNOSIS — R33.8 URINARY RETENTION DUE TO BENIGN PROSTATIC HYPERPLASIA: ICD-10-CM

## 2021-11-12 DIAGNOSIS — E29.1 HYPOGONADISM IN MALE: Primary | ICD-10-CM

## 2021-11-12 DIAGNOSIS — N52.02 CORPORO-VENOUS OCCLUSIVE ERECTILE DYSFUNCTION: ICD-10-CM

## 2021-11-12 DIAGNOSIS — N42.9 DISORDER OF PROSTATE: ICD-10-CM

## 2021-11-12 DIAGNOSIS — N40.1 URINARY RETENTION DUE TO BENIGN PROSTATIC HYPERPLASIA: ICD-10-CM

## 2021-11-12 DIAGNOSIS — N52.9 ERECTILE DYSFUNCTION, UNSPECIFIED ERECTILE DYSFUNCTION TYPE: ICD-10-CM

## 2021-11-12 PROCEDURE — 84153 ASSAY OF PSA TOTAL: CPT | Performed by: UROLOGY

## 2021-11-12 PROCEDURE — 85027 COMPLETE CBC AUTOMATED: CPT | Performed by: UROLOGY

## 2021-11-12 PROCEDURE — 82670 ASSAY OF TOTAL ESTRADIOL: CPT | Performed by: UROLOGY

## 2021-11-12 PROCEDURE — 99214 OFFICE O/P EST MOD 30 MIN: CPT | Performed by: UROLOGY

## 2021-11-12 PROCEDURE — 84403 ASSAY OF TOTAL TESTOSTERONE: CPT | Performed by: UROLOGY

## 2021-11-12 RX ORDER — TESTOSTERONE CYPIONATE 200 MG/ML
INJECTION, SOLUTION INTRAMUSCULAR
Qty: 10 ML | Refills: 2 | Status: SHIPPED | OUTPATIENT
Start: 2021-11-12 | End: 2022-05-16 | Stop reason: SDUPTHER

## 2021-11-12 RX ORDER — SILDENAFIL CITRATE 20 MG/1
TABLET ORAL
Qty: 60 TABLET | Refills: 10 | Status: SHIPPED | OUTPATIENT
Start: 2021-11-12 | End: 2022-05-16 | Stop reason: SDUPTHER

## 2021-11-12 NOTE — PROGRESS NOTES
"Chief Complaint:          Chief Complaint   Patient presents with   • low testosterone     6 month fu        HPI:   70 y.o. male patient returns today for follow-up.  He has been on testosterone replacement therapy.  He reports a dramatic improvement in his Art questionnaire: -ART-androgen deficiency in the age male questionnaire. The patient was queried regarding the androgen deficiency in the age male questionnaire.  This is a validated questionnaire that was performed on a set of 314 [place] male physicians. When it was positive it correlated directly with a 94% chance of low testosterone.  Patient indicates there is a decrease in libido or sex drive, a lack of energy, decreased  strength and endurance, a decreased \"enjoyment of life\", sad and grumpy feelings with significant difficulty maintaining erections.  There has also been a recent deterioration regarding work performance. He reports weight loss.  He has good facility and the use of subcutaneous and intramuscular injections as well as comfort level and using the medication in a sterile fashion.  He understands he should use only the prescribed dose.  He is here for appropriate lab monitoring regarding this.  He understands this is a controlled substance and therefore must be watched closely, will not be refilled in the medical loss or miscalculation of the dose.  He is very happy with the treatment and therefore wants to continue it.      Past Medical History:        Past Medical History:   Diagnosis Date   • Arthritis    • Dyslipidemia    • Fatigue    • Hyperlipidemia    • Lower back pain    • Lumbar strain    • Myalgia    • Rib pain on right side    • Rotator cuff injury 1993    bucked of horse         Current Meds:     Current Outpatient Medications   Medication Sig Dispense Refill   • anastrozole (ARIMIDEX) 1 MG tablet Take 1 tablet by mouth 1 (One) Time Per Week. 12 tablet 2   • anastrozole (ARIMIDEX) 1 MG tablet Take 1 tablet by mouth 1 (One) Time " Per Week. For 12 weeks 12 tablet 2   • finasteride (PROSCAR) 5 MG tablet Take 1 tablet by mouth Daily. 30 tablet 5   • methocarbamol (ROBAXIN) 750 MG tablet Take 1 tablet by mouth 2 (Two) Times a Day. 60 tablet 5   • naproxen (NAPROSYN) 500 MG tablet Take 1 tablet by mouth 2 (Two) Times a Day With Meals. 90 tablet 3   • sildenafil (REVATIO) 20 MG tablet TAKE 1 TO 5 TABLETS BY MOUTH ONE HOUR PRIOR TO INTERCOURSE ON AN EMPTY STOMACH 60 tablet 10   • Syringe, Disposable, 3 ML misc Use 3 ml syringe with a 25 gauge  5/8 inch needle 24 each 6   • tamsulosin (Flomax) 0.4 MG capsule 24 hr capsule Take 1 capsule by mouth 2 (two) times a day. 60 capsule 5   • Testosterone Cypionate (Depo-Testosterone) 200 MG/ML injection He is to use 1/2 cc every Monday and Thursday SQ 10 mL 2     No current facility-administered medications for this visit.        Allergies:      Allergies   Allergen Reactions   • Sulfa Antibiotics         Past Surgical History:     History reviewed. No pertinent surgical history.      Social History:     Social History     Socioeconomic History   • Marital status:    Tobacco Use   • Smoking status: Never Smoker   • Smokeless tobacco: Current User     Types: Snuff   Substance and Sexual Activity   • Alcohol use: No   • Drug use: No   • Sexual activity: Defer       Family History:     Family History   Family history unknown: Yes       Review of Systems:     Review of Systems   Constitutional: Negative.    HENT: Negative.    Eyes: Negative.    Respiratory: Negative.    Cardiovascular: Negative.    Gastrointestinal: Negative.    Endocrine: Negative.    Musculoskeletal: Negative.    Allergic/Immunologic: Negative.    Neurological: Negative.    Hematological: Negative.    Psychiatric/Behavioral: Negative.        Physical Exam:     Physical Exam  Vitals and nursing note reviewed.   Constitutional:       Appearance: He is well-developed.   HENT:      Head: Normocephalic and atraumatic.   Eyes:       Conjunctiva/sclera: Conjunctivae normal.      Pupils: Pupils are equal, round, and reactive to light.   Cardiovascular:      Rate and Rhythm: Normal rate and regular rhythm.      Heart sounds: Normal heart sounds.   Pulmonary:      Effort: Pulmonary effort is normal.      Breath sounds: Normal breath sounds.   Abdominal:      General: Bowel sounds are normal.      Palpations: Abdomen is soft.   Musculoskeletal:         General: Normal range of motion.      Cervical back: Normal range of motion.   Skin:     General: Skin is warm and dry.   Neurological:      Mental Status: He is alert and oriented to person, place, and time.      Deep Tendon Reflexes: Reflexes are normal and symmetric.   Psychiatric:         Behavior: Behavior normal.         Thought Content: Thought content normal.         Judgment: Judgment normal.         I have reviewed the following portions of the patient's history: Allergies, current medications, past family history, past medical history, past social history, past surgical history, problem list, and ROS and confirm it is accurate.      Procedure:       Assessment/Plan:   Low testosterone: Patient is here for follow-up.  Since beginning the medication, he has been very pleased.  He reports a dramatic improvement in his erections, ability to achieve and maintain an erection, improvement in libido, increase in frequency of morning erections, and a noticeable weight loss consistent with the treatment.  No development of breast problems or abnormalities.  He is going to have appropriate safety laboratory parameters checked.   He understands that the new data implicates testosterone with the development of prostate cancer and this is all but been disproven and the medical literature as well as the risks of cardiovascular disease which has actually also been disproven.  He understands that while he is a candidate for topical therapy if he is in contact with children this is not an option because it  has been shown to accentuate genitalia development at an early age that is frequently irreversible.  He also understands this it is a controlled substance and as such will not be prescribed without appropriate follow-up and appropriate laboratory investigation.  He understands effects on spermatogenesis including the fact that this is not always completely reversible and not always completely limited his ability to father a child.  He has demonstrated facility in the technique of both intramuscular and subcutaneous injection and has been taught sterility when drawing up the medication.    Erectile dysfunction-we discussed the anatomy and physiology of the penis and the endothelium.  We discussed the various forms of erectile dysfunction including peripheral vascular occlusive disease, postoperative, secondary to radiation treatments of the prostate, and arterial inflow.  We discussed the various treatment options available including oral medication and its various forms.  We discussed the use of both generic and non-generic Viagra.  We discussed Cialis and a longer half-life of 17 hours as well as the other 2 medications.  We discussed cost involved with this including the fact that the generic is much cheaper but is taken as multiple pills because they are 20 mg dosages.  We did discuss the other alternatives including penile injections, vacuum erection devices, and surgical intervention reserved for only the most severe cases.  We discussed the need for testosterone in about 20% of cases of erectile dysfunction.  Continue PDE-5 inhibition    Hyperestrogenism-we spoke about the role of estrogen metabolism and breakdown in the  presence of testosterone replacement therapy.  We spoke about how high estradiol levels can interfere with the improvement noted in a man on testosterone as well as significant side effects such as pseudogynecomastia.  We discussed the use of the medication Arimidex using a very judicious  low-dose fashion to prevent too low of an estradiol which would precipitate bone complications.  Going to check an estradiol level.    Polycythemia-I am going to check a CBC to rule out hemoglobin changes.  We utilized the American Heart Association guidelines for polycythemia which is a hemoglobin greater than 18 and a hematocrit greater than 54.5.  Recommend therapeutic phlebotomy as the treatment.  It is important that we indicate that is the most likely cause of the polycythemia.  We also discussed the possibility of decreasing the dose of testosterone and of stopping it altogether.    Controlled substance-he understands this is a controlled substance and as such is regulated under the state.  I cannot refill it outside the prescribed window.  I stressed the importance of follow-up and appropriate laboratory parameters monitoring.                This document has been electronically signed by OVIDIO ESTEVEZ MD November 12, 2021 15:02 EST

## 2021-11-13 LAB
DEPRECATED RDW RBC AUTO: 40.5 FL (ref 37–54)
ERYTHROCYTE [DISTWIDTH] IN BLOOD BY AUTOMATED COUNT: 11.8 % (ref 12.3–15.4)
ESTRADIOL SERPL HS-MCNC: 54.9 PG/ML
HCT VFR BLD AUTO: 44.2 % (ref 37.5–51)
HGB BLD-MCNC: 14.8 G/DL (ref 13–17.7)
MCH RBC QN AUTO: 31 PG (ref 26.6–33)
MCHC RBC AUTO-ENTMCNC: 33.5 G/DL (ref 31.5–35.7)
MCV RBC AUTO: 92.7 FL (ref 79–97)
PLATELET # BLD AUTO: 217 10*3/MM3 (ref 140–450)
PMV BLD AUTO: 11.7 FL (ref 6–12)
PSA SERPL-MCNC: 0.57 NG/ML (ref 0–4)
RBC # BLD AUTO: 4.77 10*6/MM3 (ref 4.14–5.8)
TESTOST SERPL-MCNC: 1462 NG/DL (ref 193–740)
WBC # BLD AUTO: 8.33 10*3/MM3 (ref 3.4–10.8)

## 2022-04-21 DIAGNOSIS — E29.1 HYPOGONADISM IN MALE: ICD-10-CM

## 2022-04-21 RX ORDER — ANASTROZOLE 1 MG/1
TABLET ORAL
Qty: 12 TABLET | Refills: 0 | Status: SHIPPED | OUTPATIENT
Start: 2022-04-21

## 2022-05-16 ENCOUNTER — TELEPHONE (OUTPATIENT)
Dept: UROLOGY | Facility: CLINIC | Age: 72
End: 2022-05-16

## 2022-05-16 DIAGNOSIS — N52.9 ERECTILE DYSFUNCTION, UNSPECIFIED ERECTILE DYSFUNCTION TYPE: ICD-10-CM

## 2022-05-16 DIAGNOSIS — E29.1 HYPOGONADISM IN MALE: ICD-10-CM

## 2022-05-16 RX ORDER — TESTOSTERONE CYPIONATE 200 MG/ML
INJECTION, SOLUTION INTRAMUSCULAR
Qty: 10 ML | Refills: 2 | Status: SHIPPED | OUTPATIENT
Start: 2022-05-16 | End: 2022-10-13 | Stop reason: SDUPTHER

## 2022-05-16 RX ORDER — SILDENAFIL CITRATE 20 MG/1
TABLET ORAL
Qty: 60 TABLET | Refills: 10 | Status: SHIPPED | OUTPATIENT
Start: 2022-05-16 | End: 2022-10-13 | Stop reason: SDUPTHER

## 2022-05-16 NOTE — TELEPHONE ENCOUNTER
Caller: GUS JI    Relationship: SELF    Best call back number:[phone]    Requested Prescriptions: SILDENAFIL, TESTOSTERONE CYPIONATE,  ANASTROZOLE, BLUE TIP DISPOSABLE SYRINGE  Requested Prescriptions      No prescriptions requested or ordered in this encounter        Pharmacy where request should be sent: [facility] [place]         Does the patient have less than a 3 day supply:  [] Yes  [x] No    Chandan Ashby Rep   05/16/22 12:02 EDT

## 2022-07-24 DIAGNOSIS — N42.9 DISORDER OF PROSTATE: Primary | ICD-10-CM

## 2022-07-25 RX ORDER — ANASTROZOLE 1 MG/1
TABLET ORAL
Qty: 12 TABLET | Refills: 3 | Status: SHIPPED | OUTPATIENT
Start: 2022-07-25

## 2022-10-13 ENCOUNTER — OFFICE VISIT (OUTPATIENT)
Dept: UROLOGY | Facility: CLINIC | Age: 72
End: 2022-10-13

## 2022-10-13 VITALS — HEIGHT: 71 IN | BODY MASS INDEX: 26.88 KG/M2 | WEIGHT: 192 LBS

## 2022-10-13 DIAGNOSIS — N41.1 CHRONIC PROSTATITIS: ICD-10-CM

## 2022-10-13 DIAGNOSIS — N62 GYNECOMASTIA, MALE: ICD-10-CM

## 2022-10-13 DIAGNOSIS — N40.1 URINARY RETENTION DUE TO BENIGN PROSTATIC HYPERPLASIA: ICD-10-CM

## 2022-10-13 DIAGNOSIS — R33.8 URINARY RETENTION DUE TO BENIGN PROSTATIC HYPERPLASIA: ICD-10-CM

## 2022-10-13 DIAGNOSIS — N52.9 ERECTILE DYSFUNCTION, UNSPECIFIED ERECTILE DYSFUNCTION TYPE: ICD-10-CM

## 2022-10-13 DIAGNOSIS — E29.1 HYPOGONADISM IN MALE: ICD-10-CM

## 2022-10-13 DIAGNOSIS — N52.02 CORPORO-VENOUS OCCLUSIVE ERECTILE DYSFUNCTION: Primary | ICD-10-CM

## 2022-10-13 LAB
ALBUMIN SERPL-MCNC: 4.1 G/DL (ref 3.5–5.2)
ALBUMIN/GLOB SERPL: 1.6 G/DL
ALP SERPL-CCNC: 64 U/L (ref 39–117)
ALT SERPL W P-5'-P-CCNC: 20 U/L (ref 1–41)
ANION GAP SERPL CALCULATED.3IONS-SCNC: 8.3 MMOL/L (ref 5–15)
AST SERPL-CCNC: 22 U/L (ref 1–40)
BILIRUB SERPL-MCNC: 0.5 MG/DL (ref 0–1.2)
BUN SERPL-MCNC: 21 MG/DL (ref 8–23)
BUN/CREAT SERPL: 21.9 (ref 7–25)
CALCIUM SPEC-SCNC: 9.3 MG/DL (ref 8.6–10.5)
CHLORIDE SERPL-SCNC: 103 MMOL/L (ref 98–107)
CO2 SERPL-SCNC: 26.7 MMOL/L (ref 22–29)
CREAT SERPL-MCNC: 0.96 MG/DL (ref 0.76–1.27)
DEPRECATED RDW RBC AUTO: 40.3 FL (ref 37–54)
EGFRCR SERPLBLD CKD-EPI 2021: 85.6 ML/MIN/1.73
ERYTHROCYTE [DISTWIDTH] IN BLOOD BY AUTOMATED COUNT: 12.2 % (ref 12.3–15.4)
ESTRADIOL SERPL HS-MCNC: 97.5 PG/ML
GLOBULIN UR ELPH-MCNC: 2.5 GM/DL
GLUCOSE SERPL-MCNC: 88 MG/DL (ref 65–99)
HCT VFR BLD AUTO: 44.4 % (ref 37.5–51)
HGB BLD-MCNC: 14.7 G/DL (ref 13–17.7)
MCH RBC QN AUTO: 30.3 PG (ref 26.6–33)
MCHC RBC AUTO-ENTMCNC: 33.1 G/DL (ref 31.5–35.7)
MCV RBC AUTO: 91.5 FL (ref 79–97)
PLATELET # BLD AUTO: 223 10*3/MM3 (ref 140–450)
PMV BLD AUTO: 11.1 FL (ref 6–12)
POTASSIUM SERPL-SCNC: 5.2 MMOL/L (ref 3.5–5.2)
PROT SERPL-MCNC: 6.6 G/DL (ref 6–8.5)
PSA SERPL-MCNC: 0.93 NG/ML (ref 0–4)
RBC # BLD AUTO: 4.85 10*6/MM3 (ref 4.14–5.8)
SODIUM SERPL-SCNC: 138 MMOL/L (ref 136–145)
TESTOST SERPL-MCNC: >1500 NG/DL (ref 193–740)
WBC NRBC COR # BLD: 6.74 10*3/MM3 (ref 3.4–10.8)

## 2022-10-13 PROCEDURE — 82670 ASSAY OF TOTAL ESTRADIOL: CPT | Performed by: UROLOGY

## 2022-10-13 PROCEDURE — 80053 COMPREHEN METABOLIC PANEL: CPT | Performed by: UROLOGY

## 2022-10-13 PROCEDURE — 85027 COMPLETE CBC AUTOMATED: CPT | Performed by: UROLOGY

## 2022-10-13 PROCEDURE — 84153 ASSAY OF PSA TOTAL: CPT | Performed by: UROLOGY

## 2022-10-13 PROCEDURE — 84403 ASSAY OF TOTAL TESTOSTERONE: CPT | Performed by: UROLOGY

## 2022-10-13 PROCEDURE — 99214 OFFICE O/P EST MOD 30 MIN: CPT | Performed by: UROLOGY

## 2022-10-13 RX ORDER — NAPROXEN 500 MG/1
500 TABLET ORAL 2 TIMES DAILY WITH MEALS
Qty: 90 TABLET | Refills: 3 | Status: SHIPPED | OUTPATIENT
Start: 2022-10-13 | End: 2023-04-04 | Stop reason: SDUPTHER

## 2022-10-13 RX ORDER — TESTOSTERONE CYPIONATE 200 MG/ML
INJECTION, SOLUTION INTRAMUSCULAR
Qty: 10 ML | Refills: 2 | Status: SHIPPED | OUTPATIENT
Start: 2022-10-13 | End: 2023-04-04 | Stop reason: SDUPTHER

## 2022-10-13 RX ORDER — SILDENAFIL CITRATE 20 MG/1
TABLET ORAL
Qty: 60 TABLET | Refills: 10 | Status: SHIPPED | OUTPATIENT
Start: 2022-10-13 | End: 2023-04-04 | Stop reason: SDUPTHER

## 2022-10-13 NOTE — PROGRESS NOTES
"Chief Complaint:    TRT    HPI:   71 y.o. male returns today.  He is doing great he has had about 5 weeks of xerostomia he went off his alpha-blocker is doing great he reports no lower urinary tract symptomatology, particularly irritative symptoms such as frequency, urgency, dysuria, and obstructive symptomatology, particularly dribbling, hesitancy, and intermittency.  I refilled his naproxen testosterone and syringes he does not need erection medication.  Also wants me to check a metabolic analysis as well and notify him of same patient returns today for follow-up.  He has been on testosterone replacement therapy.  He reports a dramatic improvement in his HARRISON questionnaire: -HARRISON-androgen deficiency in the age male questionnaire. The patient was queried regarding the androgen deficiency in the age male questionnaire.  This is a validated questionnaire that was performed on a set of 314 [place] male physicians. When it was positive it correlated directly with a 94% chance of low testosterone.  Patient indicates there is a decrease in libido or sex drive, a lack of energy, decreased  strength and endurance, a decreased \"enjoyment of life\", sad and grumpy feelings with significant difficulty maintaining erections.  There has also been a recent deterioration regarding work performance. He reports weight loss.  He has good facility and the use of subcutaneous and intramuscular injections as well as comfort level and using the medication in a sterile fashion.  He understands he should use only the prescribed dose.  He is here for appropriate lab monitoring regarding this.  He understands this is a controlled substance and therefore must be watched closely, will not be refilled in the medical loss or miscalculation of the dose.  He is very happy with the treatment and therefore wants to continue it.    Past Medical History:     Past Medical History:   Diagnosis Date   • Arthritis    • Dyslipidemia    • Fatigue    • " Hyperlipidemia    • Lower back pain    • Lumbar strain    • Myalgia    • Rib pain on right side    • Rotator cuff injury 1993    bucked of horse       Current Meds:     Current Outpatient Medications   Medication Sig Dispense Refill   • anastrozole (ARIMIDEX) 1 MG tablet Take 1 tablet by mouth 1 (One) Time Per Week. 12 tablet 2   • anastrozole (ARIMIDEX) 1 MG tablet TAKE 1 TABLET BY MOUTH ONCE WEEKLY FOR 12 WEEKS 12 tablet 0   • anastrozole (ARIMIDEX) 1 MG tablet TAKE ONE TABLET BY MOUTH ONCE WEEKLY 12 tablet 3   • finasteride (PROSCAR) 5 MG tablet Take 1 tablet by mouth Daily. 30 tablet 5   • methocarbamol (ROBAXIN) 750 MG tablet Take 1 tablet by mouth 2 (Two) Times a Day. 60 tablet 5   • naproxen (NAPROSYN) 500 MG tablet Take 1 tablet by mouth 2 (Two) Times a Day With Meals. 90 tablet 3   • sildenafil (REVATIO) 20 MG tablet As directed 60 tablet 10   • Syringe, Disposable, 3 ML misc Use 3 ml syringe with a 25 gauge  5/8 inch needle 24 each 6   • tamsulosin (Flomax) 0.4 MG capsule 24 hr capsule Take 1 capsule by mouth 2 (two) times a day. 60 capsule 5   • Testosterone Cypionate (Depo-Testosterone) 200 MG/ML injection He is to use 1/2 cc every Monday and Thursday SQ 10 mL 2     No current facility-administered medications for this visit.        Allergies:      Allergies   Allergen Reactions   • Sulfa Antibiotics         Past Surgical History:     No past surgical history on file.    Social History:     Social History     Socioeconomic History   • Marital status:    Tobacco Use   • Smoking status: Never   • Smokeless tobacco: Current     Types: Snuff   Substance and Sexual Activity   • Alcohol use: No   • Drug use: No   • Sexual activity: Defer       Family History:     Family History   Family history unknown: Yes       Review of Systems:     Review of Systems   Constitutional: Negative.    HENT: Negative.    Eyes: Negative.    Respiratory: Negative.    Cardiovascular: Negative.    Gastrointestinal: Negative.     Endocrine: Negative.    Musculoskeletal: Negative.    Allergic/Immunologic: Negative.    Neurological: Negative.    Hematological: Negative.    Psychiatric/Behavioral: Negative.        Physical Exam:     Physical Exam  Vitals and nursing note reviewed.   Constitutional:       Appearance: He is well-developed.   HENT:      Head: Normocephalic and atraumatic.   Eyes:      Conjunctiva/sclera: Conjunctivae normal.      Pupils: Pupils are equal, round, and reactive to light.   Cardiovascular:      Rate and Rhythm: Normal rate and regular rhythm.      Heart sounds: Normal heart sounds.   Pulmonary:      Effort: Pulmonary effort is normal.      Breath sounds: Normal breath sounds.   Abdominal:      General: Bowel sounds are normal.      Palpations: Abdomen is soft.   Musculoskeletal:         General: Normal range of motion.      Cervical back: Normal range of motion.   Skin:     General: Skin is warm and dry.   Neurological:      Mental Status: He is alert and oriented to person, place, and time.      Deep Tendon Reflexes: Reflexes are normal and symmetric.   Psychiatric:         Behavior: Behavior normal.         Thought Content: Thought content normal.         Judgment: Judgment normal.         I have reviewed the following portions of the patient's history: Allergies, current medications, past family history, past medical history, past social history, past surgical history, problem list, and ROS and confirm it is accurate.    Recent Image (CT and/or KUB):      CT Abdomen and Pelvis: No results found for this or any previous visit.       CT Stone Protocol: No results found for this or any previous visit.       KUB: No results found for this or any previous visit.       Labs (past 3 months):      No visits with results within 3 Month(s) from this visit.   Latest known visit with results is:   Office Visit on 11/12/2021   Component Date Value Ref Range Status   • PSA 11/12/2021 0.571  0.000 - 4.000 ng/mL Final   •  Testosterone, Total 11/12/2021 1,462.00 (H)  193.00 - 740.00 ng/dL Final   • Estradiol 11/12/2021 54.9  pg/mL Final   • WBC 11/12/2021 8.33  3.40 - 10.80 10*3/mm3 Final   • RBC 11/12/2021 4.77  4.14 - 5.80 10*6/mm3 Final   • Hemoglobin 11/12/2021 14.8  13.0 - 17.7 g/dL Final   • Hematocrit 11/12/2021 44.2  37.5 - 51.0 % Final   • MCV 11/12/2021 92.7  79.0 - 97.0 fL Final   • MCH 11/12/2021 31.0  26.6 - 33.0 pg Final   • MCHC 11/12/2021 33.5  31.5 - 35.7 g/dL Final   • RDW 11/12/2021 11.8 (L)  12.3 - 15.4 % Final   • RDW-SD 11/12/2021 40.5  37.0 - 54.0 fl Final   • MPV 11/12/2021 11.7  6.0 - 12.0 fL Final   • Platelets 11/12/2021 217  140 - 450 10*3/mm3 Final        Procedure:       Assessment/Plan:   Low testosterone: Patient is here for follow-up.  Since beginning the medication, he has been very pleased.  He reports a dramatic improvement in his erections, ability to achieve and maintain an erection, improvement in libido, increase in frequency of morning erections, and a noticeable weight loss consistent with the treatment.   He is going to have appropriate safety laboratory parameters checked.   He understands that the new data implicates testosterone with the development of prostate cancer and this is all but been disproven and the medical literature as well as the risks of cardiovascular disease which has actually also been disproven.  He understands that while he is a candidate for topical therapy if he is in contact with children this is not an option because it has been shown to accentuate genitalia development at an early age that is frequently irreversible.  He also understands this it is a controlled substance and as such will not be prescribed without appropriate follow-up and appropriate laboratory investigation.  He understands effects on spermatogenesis including the fact that this is not always completely reversible and not always completely limited his ability to father a child.  He has  demonstrated facility in the technique of both intramuscular and subcutaneous injection and has been taught sterility when drawing up the medication.    Xerostomia- fine no obvious etiology from urologic standpoint    hyperestrogenism-we spoke about the role of estrogen metabolism and breakdown in the  presence of testosterone replacement therapy.  We spoke about how high estradiol levels can interfere with the improvement noted in a man on testosterone as well as significant side effects such as pseudogynecomastia.  We discussed the use of the medication Arimidex used in an off label setting and using a very judicious low-dose fashion to prevent too low of an estradiol which would precipitate bone complications.  Going to check an estradiol level.  He is at higher risk for breast problems due to his replacement    Polycythemia-I am going to check a CBC to rule out hemoglobin changes.  We utilized the American Heart Association guidelines for polycythemia which is a hemoglobin greater than 18 and a hematocrit greater than 54.5.  Recommend therapeutic phlebotomy as the treatment.  It is important that we indicate that is the most likely cause of the polycythemia.  We also discussed the possibility of decreasing the dose of testosterone and of stopping it altogether.    Controlled substance-he understands this is a controlled substance and as such is regulated under the state.  I cannot refill it outside the prescribed window.  I stressed the importance of follow-up and appropriate laboratory parameters monitoring.    Liver function tests-according to the AUA guidelines we will not check liver functions due to the fact this is not an oral alkylated testosterone              This document has been electronically signed by OVIDIO ESTEVEZ MD October 13, 2022 07:59 EDT    Dictated Utilizing Dragon Dictation: Part of this note may be an electronic transcription/translation of spoken language to printed text using the  [facility] Dictation System.

## 2022-10-14 ENCOUNTER — TELEPHONE (OUTPATIENT)
Dept: UROLOGY | Facility: CLINIC | Age: 72
End: 2022-10-14

## 2022-10-17 NOTE — TELEPHONE ENCOUNTER
I tried to call the pt back with his labs but everytime I called it was like his phone was messing up. His labs were good

## 2023-04-04 ENCOUNTER — OFFICE VISIT (OUTPATIENT)
Dept: UROLOGY | Facility: CLINIC | Age: 73
End: 2023-04-04
Payer: COMMERCIAL

## 2023-04-04 VITALS
HEIGHT: 71 IN | SYSTOLIC BLOOD PRESSURE: 133 MMHG | DIASTOLIC BLOOD PRESSURE: 79 MMHG | WEIGHT: 192 LBS | HEART RATE: 73 BPM | BODY MASS INDEX: 26.88 KG/M2

## 2023-04-04 DIAGNOSIS — R33.8 URINARY RETENTION DUE TO BENIGN PROSTATIC HYPERPLASIA: ICD-10-CM

## 2023-04-04 DIAGNOSIS — N42.9 DISORDER OF PROSTATE: Primary | ICD-10-CM

## 2023-04-04 DIAGNOSIS — N52.9 ERECTILE DYSFUNCTION, UNSPECIFIED ERECTILE DYSFUNCTION TYPE: ICD-10-CM

## 2023-04-04 DIAGNOSIS — R79.89 LOW TESTOSTERONE: ICD-10-CM

## 2023-04-04 DIAGNOSIS — E29.1 HYPOGONADISM IN MALE: ICD-10-CM

## 2023-04-04 DIAGNOSIS — N40.1 URINARY RETENTION DUE TO BENIGN PROSTATIC HYPERPLASIA: ICD-10-CM

## 2023-04-04 PROCEDURE — 84403 ASSAY OF TOTAL TESTOSTERONE: CPT | Performed by: UROLOGY

## 2023-04-04 PROCEDURE — 84153 ASSAY OF PSA TOTAL: CPT | Performed by: UROLOGY

## 2023-04-04 PROCEDURE — 99213 OFFICE O/P EST LOW 20 MIN: CPT | Performed by: UROLOGY

## 2023-04-04 PROCEDURE — 82670 ASSAY OF TOTAL ESTRADIOL: CPT | Performed by: UROLOGY

## 2023-04-04 PROCEDURE — 36415 COLL VENOUS BLD VENIPUNCTURE: CPT | Performed by: UROLOGY

## 2023-04-04 PROCEDURE — 85027 COMPLETE CBC AUTOMATED: CPT | Performed by: UROLOGY

## 2023-04-04 RX ORDER — TESTOSTERONE CYPIONATE 200 MG/ML
INJECTION, SOLUTION INTRAMUSCULAR
Qty: 10 ML | Refills: 2 | Status: SHIPPED | OUTPATIENT
Start: 2023-04-04

## 2023-04-04 RX ORDER — SILDENAFIL CITRATE 20 MG/1
TABLET ORAL
Qty: 60 TABLET | Refills: 10 | Status: SHIPPED | OUTPATIENT
Start: 2023-04-04

## 2023-04-04 RX ORDER — NAPROXEN 500 MG/1
500 TABLET ORAL 2 TIMES DAILY WITH MEALS
Qty: 90 TABLET | Refills: 3 | Status: SHIPPED | OUTPATIENT
Start: 2023-04-04

## 2023-04-04 NOTE — PROGRESS NOTES
"Chief Complaint:      Chief Complaint   Patient presents with   • Prostate disorder    • low testosterone     Follow up        HPI:   72 y.o. male patient returns today for follow-up.  He has been on testosterone replacement therapy.  He reports a dramatic improvement in his HARRISON questionnaire: -HARRISON-androgen deficiency in the age male questionnaire. The patient was queried regarding the androgen deficiency in the age male questionnaire.  This is a validated questionnaire that was performed on a set of 314 [place] male physicians. When it was positive it correlated directly with a 94% chance of low testosterone.  Patient indicates there is a decrease in libido or sex drive, a lack of energy, decreased  strength and endurance, a decreased \"enjoyment of life\", sad and grumpy feelings with significant difficulty maintaining erections.  There has also been a recent deterioration regarding work performance. He reports weight loss.  He has good facility and the use of subcutaneous and intramuscular injections as well as comfort level and using the medication in a sterile fashion.  He understands he should use only the prescribed dose.  He is here for appropriate lab monitoring regarding this.  He understands this is a controlled substance and therefore must be watched closely, will not be refilled in the medical loss or miscalculation of the dose.  He is very happy with the treatment and therefore wants to continue it.    Past Medical History:     Past Medical History:   Diagnosis Date   • Arthritis    • Dyslipidemia    • Fatigue    • Hyperlipidemia    • Lower back pain    • Lumbar strain    • Myalgia    • Rib pain on right side    • Rotator cuff injury 1993    bucked of horse       Current Meds:     Current Outpatient Medications   Medication Sig Dispense Refill   • anastrozole (ARIMIDEX) 1 MG tablet Take 1 tablet by mouth 1 (One) Time Per Week. 12 tablet 2   • anastrozole (ARIMIDEX) 1 MG tablet TAKE 1 TABLET BY MOUTH " ONCE WEEKLY FOR 12 WEEKS 12 tablet 0   • anastrozole (ARIMIDEX) 1 MG tablet TAKE ONE TABLET BY MOUTH ONCE WEEKLY 12 tablet 3   • finasteride (PROSCAR) 5 MG tablet Take 1 tablet by mouth Daily. 30 tablet 5   • methocarbamol (ROBAXIN) 750 MG tablet Take 1 tablet by mouth 2 (Two) Times a Day. 60 tablet 5   • naproxen (NAPROSYN) 500 MG tablet Take 1 tablet by mouth 2 (Two) Times a Day With Meals. 90 tablet 3   • sildenafil (REVATIO) 20 MG tablet As directed 60 tablet 10   • Syringe, Disposable, 3 ML misc Use 3 ml syringe with a 25 gauge  5/8 inch needle 24 each 6   • tamsulosin (Flomax) 0.4 MG capsule 24 hr capsule Take 1 capsule by mouth 2 (two) times a day. 60 capsule 5   • Testosterone Cypionate (Depo-Testosterone) 200 MG/ML injection He is to use 1/2 cc every Monday and Thursday SQ 10 mL 2     No current facility-administered medications for this visit.        Allergies:      Allergies   Allergen Reactions   • Sulfa Antibiotics         Past Surgical History:     History reviewed. No pertinent surgical history.    Social History:     Social History     Socioeconomic History   • Marital status:    Tobacco Use   • Smoking status: Never   • Smokeless tobacco: Current     Types: Snuff   Substance and Sexual Activity   • Alcohol use: No   • Drug use: No   • Sexual activity: Defer       Family History:     Family History   Family history unknown: Yes       Review of Systems:     Review of Systems   Constitutional: Negative.    HENT: Negative.    Eyes: Negative.    Respiratory: Negative.    Cardiovascular: Negative.    Gastrointestinal: Negative.    Endocrine: Negative.    Musculoskeletal: Negative.    Allergic/Immunologic: Negative.    Neurological: Negative.    Hematological: Negative.    Psychiatric/Behavioral: Negative.        Physical Exam:     Physical Exam  Vitals and nursing note reviewed.   Constitutional:       Appearance: He is well-developed.   HENT:      Head: Normocephalic and atraumatic.   Eyes:       Conjunctiva/sclera: Conjunctivae normal.      Pupils: Pupils are equal, round, and reactive to light.   Cardiovascular:      Rate and Rhythm: Normal rate and regular rhythm.      Heart sounds: Normal heart sounds.   Pulmonary:      Effort: Pulmonary effort is normal.      Breath sounds: Normal breath sounds.   Abdominal:      General: Bowel sounds are normal.      Palpations: Abdomen is soft.   Musculoskeletal:         General: Normal range of motion.      Cervical back: Normal range of motion.   Skin:     General: Skin is warm and dry.   Neurological:      Mental Status: He is alert and oriented to person, place, and time.      Deep Tendon Reflexes: Reflexes are normal and symmetric.   Psychiatric:         Behavior: Behavior normal.         Thought Content: Thought content normal.         Judgment: Judgment normal.         I have reviewed the following portions of the patient's history: Allergies, current medications, past family history, past medical history, past social history, past surgical history, problem list, and ROS and confirm it is accurate.    Recent Image (CT and/or KUB):      CT Abdomen and Pelvis: No results found for this or any previous visit.       CT Stone Protocol: No results found for this or any previous visit.       KUB: No results found for this or any previous visit.       Labs (past 3 months):      No visits with results within 3 Month(s) from this visit.   Latest known visit with results is:   Office Visit on 10/13/2022   Component Date Value Ref Range Status   • PSA 10/13/2022 0.934  0.000 - 4.000 ng/mL Final   • Testosterone, Total 10/13/2022 >1,500.00 (H)  193.00 - 740.00 ng/dL Final   • Estradiol 10/13/2022 97.5  pg/mL Final   • WBC 10/13/2022 6.74  3.40 - 10.80 10*3/mm3 Final   • RBC 10/13/2022 4.85  4.14 - 5.80 10*6/mm3 Final   • Hemoglobin 10/13/2022 14.7  13.0 - 17.7 g/dL Final   • Hematocrit 10/13/2022 44.4  37.5 - 51.0 % Final   • MCV 10/13/2022 91.5  79.0 - 97.0 fL Final   •  MCH 10/13/2022 30.3  26.6 - 33.0 pg Final   • MCHC 10/13/2022 33.1  31.5 - 35.7 g/dL Final   • RDW 10/13/2022 12.2 (L)  12.3 - 15.4 % Final   • RDW-SD 10/13/2022 40.3  37.0 - 54.0 fl Final   • MPV 10/13/2022 11.1  6.0 - 12.0 fL Final   • Platelets 10/13/2022 223  140 - 450 10*3/mm3 Final   • Glucose 10/13/2022 88  65 - 99 mg/dL Final   • BUN 10/13/2022 21  8 - 23 mg/dL Final   • Creatinine 10/13/2022 0.96  0.76 - 1.27 mg/dL Final   • Sodium 10/13/2022 138  136 - 145 mmol/L Final   • Potassium 10/13/2022 5.2  3.5 - 5.2 mmol/L Final   • Chloride 10/13/2022 103  98 - 107 mmol/L Final   • CO2 10/13/2022 26.7  22.0 - 29.0 mmol/L Final   • Calcium 10/13/2022 9.3  8.6 - 10.5 mg/dL Final   • Total Protein 10/13/2022 6.6  6.0 - 8.5 g/dL Final   • Albumin 10/13/2022 4.10  3.50 - 5.20 g/dL Final   • ALT (SGPT) 10/13/2022 20  1 - 41 U/L Final   • AST (SGOT) 10/13/2022 22  1 - 40 U/L Final   • Alkaline Phosphatase 10/13/2022 64  39 - 117 U/L Final   • Total Bilirubin 10/13/2022 0.5  0.0 - 1.2 mg/dL Final   • Globulin 10/13/2022 2.5  gm/dL Final   • A/G Ratio 10/13/2022 1.6  g/dL Final   • BUN/Creatinine Ratio 10/13/2022 21.9  7.0 - 25.0 Final   • Anion Gap 10/13/2022 8.3  5.0 - 15.0 mmol/L Final   • eGFR 10/13/2022 85.6  >60.0 mL/min/1.73 Final    National Kidney Foundation and American Society of Nephrology (ASN) Task Force recommended calculation based on the Chronic Kidney Disease Epidemiology Collaboration (CKD-EPI) equation refit without adjustment for race.        Procedure:       Assessment/Plan:   Low testosterone: Patient is here for follow-up.  Since beginning the medication, he has been very pleased.  He reports a dramatic improvement in his erections, ability to achieve and maintain an erection, improvement in libido, increase in frequency of morning erections, and a noticeable weight loss consistent with the treatment.   He is going to have appropriate safety laboratory parameters checked.   He understands that the  new data implicates testosterone with the development of prostate cancer and this is all but been disproven and the medical literature as well as the risks of cardiovascular disease which has actually also been disproven.  He understands that while he is a candidate for topical therapy if he is in contact with children this is not an option because it has been shown to accentuate genitalia development at an early age that is frequently irreversible.  He also understands this it is a controlled substance and as such will not be prescribed without appropriate follow-up and appropriate laboratory investigation.  He understands effects on spermatogenesis including the fact that this is not always completely reversible and not always completely limited his ability to father a child.  He has demonstrated facility in the technique of both intramuscular and subcutaneous injection and has been taught sterility when drawing up the medication.    Erectile dysfunction-we discussed the anatomy and physiology of the penis and the endothelium.  We discussed the various forms of erectile dysfunction including peripheral vascular occlusive disease, postoperative, secondary to radiation treatments of the prostate, and arterial inflow.  We discussed the various treatment options available including oral medication and its various forms.  We discussed the use of both generic and non-generic Viagra.  We discussed Cialis and a longer half-life of 17 hours as well as the other 2 medications.  We discussed cost involved with this including the fact that the generic is much cheaper but is taken as multiple pills because they are 20 mg dosages.  We did discuss the other alternatives including penile injections, vacuum erection devices, and surgical intervention reserved for only the most severe cases.  We discussed the need for testosterone in about 20% of cases of erectile dysfunction.  Continue PDE-5 inhibition    PSA testing-I am  recommending a PSA blood test that stands for prostate specific antigen.  I discussed the pathophysiology of PSA testing indicating its use in the diagnosis and management of prostate cancer.  I discussed the normal range being 0 to 4, but more appropriately being much closer to 0 to 2 in a normal male.  I discussed the fact that after a certain age we don't recommend PSA testing especially in view of numerous comorbidities, that this will not be a useful test.  I discussed many of the things that can artificially raise PSA including a recent infection, urinary tract infection, and recent sexual intercourse, or even the type of movement such as manipulation of the prostate from riding a bicycle.  After all this is taken into account when the test is reviewed, the most important use of PSA is the velocity measurement.  In other words, the change of PSA with time is a very important factor in the use and that we look for greater than 20% rise over a year to help us make the prediction of prostate cancer.  I also discussed that the use with prostate cancer indicating that after a radical prostatectomy, the PSA should be 0 and any rise indicates an early biochemical recurrence.    Hyperestrogenism-we spoke about the role of estrogen metabolism and breakdown in the  presence of testosterone replacement therapy.  We spoke about how high estradiol levels can interfere with the improvement noted in a man on testosterone as well as significant side effects such as pseudogynecomastia.  We discussed the use of the medication Arimidex used in an off label setting and using a very judicious low-dose fashion to prevent too low of an estradiol which would precipitate bone complications.  Going to check an estradiol level.  He is at higher risk for breast problems due to his replacement    Polycythemia-I am going to check a CBC to rule out hemoglobin changes.  We utilized the American Heart Association guidelines for polycythemia  which is a hemoglobin greater than 18 and a hematocrit greater than 54.5.  Recommend therapeutic phlebotomy as the treatment.  It is important that we indicate that is the most likely cause of the polycythemia.  We also discussed the possibility of decreasing the dose of testosterone and of stopping it altogether.    Controlled substance-he understands this is a controlled substance and as such is regulated under the state.  I cannot refill it outside the prescribed window.  I stressed the importance of follow-up and appropriate laboratory parameters monitoring.    Liver function tests-according to the AUA guidelines we will not check liver functions due to the fact this is not an oral alkylated testosterone      This document has been electronically signed by OVIDIO ESTEVEZ MD April 4, 2023 13:52 EDT    Dictated Utilizing Dragon Dictation: Part of this note may be an electronic transcription/translation of spoken language to printed text using the Dragon Dictation System.

## 2023-04-05 ENCOUNTER — TELEPHONE (OUTPATIENT)
Dept: UROLOGY | Facility: CLINIC | Age: 73
End: 2023-04-05
Payer: COMMERCIAL

## 2023-04-05 LAB
DEPRECATED RDW RBC AUTO: 37.7 FL (ref 37–54)
ERYTHROCYTE [DISTWIDTH] IN BLOOD BY AUTOMATED COUNT: 11.4 % (ref 12.3–15.4)
ESTRADIOL SERPL HS-MCNC: 80.8 PG/ML
HCT VFR BLD AUTO: 43.3 % (ref 37.5–51)
HGB BLD-MCNC: 14.9 G/DL (ref 13–17.7)
MCH RBC QN AUTO: 31.2 PG (ref 26.6–33)
MCHC RBC AUTO-ENTMCNC: 34.4 G/DL (ref 31.5–35.7)
MCV RBC AUTO: 90.8 FL (ref 79–97)
PLATELET # BLD AUTO: 232 10*3/MM3 (ref 140–450)
PMV BLD AUTO: 11.4 FL (ref 6–12)
PSA SERPL-MCNC: 1.25 NG/ML (ref 0–4)
RBC # BLD AUTO: 4.77 10*6/MM3 (ref 4.14–5.8)
TESTOST SERPL-MCNC: >1500 NG/DL (ref 193–740)
WBC NRBC COR # BLD: 6.62 10*3/MM3 (ref 3.4–10.8)

## 2023-04-21 ENCOUNTER — TELEPHONE (OUTPATIENT)
Dept: UROLOGY | Facility: CLINIC | Age: 73
End: 2023-04-21
Payer: COMMERCIAL

## 2023-04-21 DIAGNOSIS — E29.1 HYPOGONADISM IN MALE: Primary | ICD-10-CM

## 2023-04-21 RX ORDER — ANASTROZOLE 1 MG/1
1 TABLET ORAL WEEKLY
Qty: 12 TABLET | Refills: 3 | Status: SHIPPED | OUTPATIENT
Start: 2023-04-21

## 2023-04-21 NOTE — TELEPHONE ENCOUNTER
I called the pt and left him a vm and sent the arimidex in    ----- Message from Pablo Marx MD sent at 4/17/2023  8:43 AM EDT -----  After thorough review of his labs his estradiol level is above the normal range and in concert with his testosterone replacement I am recommending 1 mg of Arimidex per week.  This is an off label use.  Its use is condoned in the AUA guidelines.  And we will monitor his estradiol closely.  The primary reason we do not want estradiol to get too high is because of bone damage and gynecomastia.  ----- Message -----  From: Lab, Background User  Sent: 4/5/2023   2:07 AM EDT  To: Pablo Marx MD

## 2023-04-25 ENCOUNTER — TELEPHONE (OUTPATIENT)
Dept: UROLOGY | Facility: CLINIC | Age: 73
End: 2023-04-25
Payer: COMMERCIAL

## 2023-04-25 NOTE — TELEPHONE ENCOUNTER
Caller: Ulices Morrissey    Relationship to patient: SELF    Best call back number: [phone]    Patient is needing: PT CALLED AND NEW MEDICATION SENT TO WRONG [facility]. PLEASE RESEND TO     [facility] [id number] - [place], [place] - [address] AT [facility] [address] - [phone]  - [phone] FX Phone:  [phone]   Fax:  [phone]            PLEASE CALL PT BACK WHEN MEDICATION IS SENT TO THE NEW [facility].

## 2023-04-25 NOTE — TELEPHONE ENCOUNTER
Routing message to Francisco J- he told me he did not want to resend it due to it is the same towna nd same pharmacy. I called the wrong [facility] and asked them to please transfer to other one and I removed the Wrong [facility] from his pharmacy list.

## 2023-04-28 DIAGNOSIS — R33.8 URINARY RETENTION DUE TO BENIGN PROSTATIC HYPERPLASIA: ICD-10-CM

## 2023-04-28 DIAGNOSIS — E29.1 HYPOGONADISM IN MALE: ICD-10-CM

## 2023-04-28 DIAGNOSIS — N40.1 URINARY RETENTION DUE TO BENIGN PROSTATIC HYPERPLASIA: ICD-10-CM

## 2023-05-01 RX ORDER — NAPROXEN 500 MG/1
TABLET ORAL
Qty: 90 TABLET | Refills: 3 | OUTPATIENT
Start: 2023-05-01

## 2023-05-01 RX ORDER — TESTOSTERONE CYPIONATE 200 MG/ML
INJECTION, SOLUTION INTRAMUSCULAR
Qty: 4 ML | OUTPATIENT
Start: 2023-05-01

## 2023-05-23 ENCOUNTER — OFFICE VISIT (OUTPATIENT)
Dept: FAMILY MEDICINE CLINIC | Facility: CLINIC | Age: 73
End: 2023-05-23
Payer: COMMERCIAL

## 2023-05-23 VITALS
SYSTOLIC BLOOD PRESSURE: 130 MMHG | HEART RATE: 68 BPM | WEIGHT: 211.8 LBS | TEMPERATURE: 97.9 F | BODY MASS INDEX: 29.65 KG/M2 | HEIGHT: 71 IN | DIASTOLIC BLOOD PRESSURE: 80 MMHG | OXYGEN SATURATION: 96 %

## 2023-05-23 DIAGNOSIS — E78.5 DYSLIPIDEMIA: ICD-10-CM

## 2023-05-23 DIAGNOSIS — M17.11 PRIMARY OSTEOARTHRITIS OF RIGHT KNEE: Primary | ICD-10-CM

## 2023-05-23 DIAGNOSIS — Z00.00 HEALTH MAINTENANCE EXAMINATION: ICD-10-CM

## 2023-05-23 RX ORDER — TRIAMCINOLONE ACETONIDE 40 MG/ML
40 INJECTION, SUSPENSION INTRA-ARTICULAR; INTRAMUSCULAR ONCE
Status: COMPLETED | OUTPATIENT
Start: 2023-05-23 | End: 2023-05-23

## 2023-05-23 RX ORDER — LIDOCAINE HYDROCHLORIDE 10 MG/ML
3 INJECTION, SOLUTION INFILTRATION; PERINEURAL ONCE
Status: COMPLETED | OUTPATIENT
Start: 2023-05-23 | End: 2023-05-23

## 2023-05-23 RX ORDER — DEXAMETHASONE SODIUM PHOSPHATE 4 MG/ML
4 INJECTION, SOLUTION INTRA-ARTICULAR; INTRALESIONAL; INTRAMUSCULAR; INTRAVENOUS; SOFT TISSUE ONCE
Status: COMPLETED | OUTPATIENT
Start: 2023-05-23 | End: 2023-05-23

## 2023-05-23 RX ADMIN — LIDOCAINE HYDROCHLORIDE 3 ML: 10 INJECTION, SOLUTION INFILTRATION; PERINEURAL at 15:43

## 2023-05-23 RX ADMIN — DEXAMETHASONE SODIUM PHOSPHATE 4 MG: 4 INJECTION, SOLUTION INTRA-ARTICULAR; INTRALESIONAL; INTRAMUSCULAR; INTRAVENOUS; SOFT TISSUE at 15:42

## 2023-05-23 RX ADMIN — TRIAMCINOLONE ACETONIDE 40 MG: 40 INJECTION, SUSPENSION INTRA-ARTICULAR; INTRAMUSCULAR at 15:44

## 2023-05-23 NOTE — PROGRESS NOTES
Subjective        Chief Complaint  Knee Pain    Subjective      History of Present Illness  Ulices Morrissey is a 72 y.o. male who presents today to [facility] for Knee Pain. He has a past medical history of Arthritis, Hyperlipidemia, low testosterone, BPH, chronic prostatitis, and Rotator cuff injury (1993).    Knee Pain:  Ulices reports that he has been running for exercise for the last several years.  He has had intermittent pain in the right knee which has recently been gradually worsening.  He denies any falls or injuries.  He has a remote history of having a corticosteroid injection into the right knee 20+ years ago which did help his symptoms at that time.  No previous surgeries reported.    Dyslipidemia:  He reports his cholesterol has fluctuated some over the past few years.  He has not had any recent fasting lipid panel.  He is not currently on any medical therapy.    Low testosterone:  Chronic prostatitis:  BPH:  ED:  Currently following with [facility] urology, Dr. Marx.  He is managed on tamsulosin, finasteride, anastrozole, and sildenafil.      Current Outpatient Medications:   •  anastrozole (ARIMIDEX) 1 MG tablet, Take 1 tablet by mouth 1 (One) Time Per Week. For 12 weeks refills in case another round is needed in the future., Disp: 12 tablet, Rfl: 3  •  finasteride (PROSCAR) 5 MG tablet, Take 1 tablet by mouth Daily., Disp: 30 tablet, Rfl: 5  •  methocarbamol (ROBAXIN) 750 MG tablet, Take 1 tablet by mouth 2 (Two) Times a Day., Disp: 60 tablet, Rfl: 5  •  naproxen (NAPROSYN) 500 MG tablet, Take 1 tablet by mouth 2 (Two) Times a Day With Meals., Disp: 90 tablet, Rfl: 3  •  sildenafil (REVATIO) 20 MG tablet, As directed, Disp: 60 tablet, Rfl: 10  •  Syringe, Disposable, 3 ML misc, Use 3 ml syringe with a 25 gauge  5/8 inch needle, Disp: 24 each, Rfl: 6  •  tamsulosin (Flomax) 0.4 MG capsule 24 hr capsule, Take 1 capsule by mouth 2 (two) times a day., Disp: 60  "capsule, Rfl: 5  •  Testosterone Cypionate (Depo-Testosterone) 200 MG/ML injection, He is to use 1/2 cc every Monday and Thursday SQ, Disp: 10 mL, Rfl: 2  No current facility-administered medications for this visit.      Allergies   Allergen Reactions   • Sulfa Antibiotics      Objective     Objective   Vital Signs:  Blood Pressure 130/80   Pulse 68   Temperature 97.9 °F (36.6 °C) (Temporal)   Height 180.3 cm (70.98\")   Weight 96.1 kg (211 lb 12.8 oz)   Oxygen Saturation 96%   Body Mass Index 29.55 kg/m²   Estimated body mass index is 29.55 kg/m² as calculated from the following:    Height as of this encounter: 180.3 cm (70.98\").    Weight as of this encounter: 96.1 kg (211 lb 12.8 oz).        Past Medical History:   Diagnosis Date   • Arthritis    • Dyslipidemia    • Fatigue    • Hyperlipidemia    • Lower back pain    • Lumbar strain    • Myalgia    • Rib pain on right side    • Rotator cuff injury 1993    bucked of horse     No past surgical history on file.  Social History     Socioeconomic History   • Marital status:    Tobacco Use   • Smoking status: Never   • Smokeless tobacco: Current     Types: Snuff   Substance and Sexual Activity   • Alcohol use: No   • Drug use: No   • Sexual activity: Defer      Physical Exam  Vitals and nursing note reviewed.   Constitutional:       General: He is not in acute distress.     Appearance: He is well-developed. He is not diaphoretic.   HENT:      Head: Normocephalic and atraumatic.   Eyes:      General: No scleral icterus.        Right eye: No discharge.         Left eye: No discharge.      Conjunctiva/sclera: Conjunctivae normal.   Cardiovascular:      Rate and Rhythm: Normal rate and regular rhythm.      Heart sounds: Normal heart sounds. No murmur heard.    No friction rub. No gallop.   Pulmonary:      Effort: Pulmonary effort is normal. No respiratory distress.      Breath sounds: Normal breath sounds. No wheezing or rales.   Chest:      Chest wall: No " tenderness.   Musculoskeletal:         General: Swelling present. Normal range of motion.      Cervical back: Normal range of motion and neck supple.      Comments: Mild edema of the right knee in comparison to the left.  There is some slight crepitus with movement of the patella.  No obvious joint effusion.  No redness or warmth.   Skin:     General: Skin is warm and dry.      Coloration: Skin is not pale.      Findings: No erythema or rash.   Neurological:      Mental Status: He is alert and oriented to person, place, and time.   Psychiatric:         Behavior: Behavior normal.        Result Review :  The following data was reviewed by: MENDEL Dillon on 05/23/2023:  TSH   Date Value Ref Range Status   10/11/2016 2.224 0.550 - 4.780 mIU/mL Final     HDL Cholesterol   Date Value Ref Range Status   08/29/2018 73 60 - 100 mg/dL Final     LDL Cholesterol    Date Value Ref Range Status   08/29/2018 63 0 - 100 mg/dL Final     Triglycerides   Date Value Ref Range Status   08/29/2018 49 0 - 150 mg/dL Final     Total Cholesterol   Date Value Ref Range Status   10/11/2016 213 (H) 0 - 200 mg/dL Final     Comment:     Cholesterol Reference Ranges:   Desirable       < 200 mg/dL   Borderline    200-239 mg/dL   High Risk       > 239 mg/dL  Triglyceride Reference Ranges:   Normal          < 150 mg/dL   Borderline    150-199 mg/dL   High          200-499 mg/dL   Very High       > 499 mg/dL  HDL Reference Ranges:   Low              < 40 mg/dL   High             > 59 mg/dL  LDL Reference Ranges:   Optimal         < 100 mg/dL   Near Optimal  100-129 mg/dL   Borderline    130-159 mg/dL   High          160-189 mg/dL   Very High       > 189 mg/dL         Joint Aspiration/Injection    Date/Time: 5/23/2023 3:45 PM  Performed by: Cira Aaron PA  Authorized by: Cira Aaron PA   Consent: Verbal consent obtained.  Risks and benefits: risks, benefits and alternatives were discussed  Consent given by:  patient  Patient understanding: patient states understanding of the procedure being performed  Patient consent: the patient's understanding of the procedure matches consent given  Procedure consent: procedure consent matches procedure scheduled  Site marked: the operative site was marked  Patient identity confirmed: verbally with patient  Indications: joint swelling and pain   Body area: knee  Local anesthesia used: no    Anesthesia:  Local anesthesia used: no  Preparation: Patient was prepped and draped in the usual sterile fashion.  Needle gauge: 25g 1.5inch.  Ultrasound guidance: no  Approach: anterior  Patient tolerance: patient tolerated the procedure well with no immediate complications         Procedure: Right knee intra-articular injection  The procedure risks and benefits were explained to patient and the patient gave verbal consent to have the procedure performed.  Indication: acute exacerbation of osteoarthritis of knee  Provider: Cira Aaron PA-C  Description: The right knee was prepped and draped in sterile fashion.  An injection was given into the right knee using 3 cc of 1% lidocaine, 1 cc Decadron and 1 cc of triamcinolone acetate using the anterior approach.  Pressure was held and a sterile dressing was placed.  No consultations  Estimated blood loss: Minimal  Patient tolerated the procedure well.     1% lidocaine 500 mg per 50 mL NDC # [id number] Exp 02/2026  Triamcinolone acetate 40 mg per 1 mL NDC#[id number] Exp 06/2024  Decadron 4 mg per 1 mL NDC #[id number] Exp 12/2023     Assessment / Plan         Assessment   Diagnoses and all orders for this visit:    1. Primary osteoarthritis of right knee (Primary)  • Ulices has requested a steroid injection into his right knee.  Risks/benefits of joint injection discussed with the patient today.  He expressed understanding and agreed.  • See procedure note above.  Patient tolerated the procedure well without any immediate complications.  • He was  encouraged to rest the knee over the next 48 hours, ice for 15 to 20 minutes 3 times a day.  • Can take Tylenol/naproxen as needed for pain.  • Gradually reintroduce activity/running.  • Call with concerns.  • If no significant improvement, will consider specific imaging of the knee as well as possible physical therapy.  -     dexamethasone (DECADRON) injection 4 mg  -     triamcinolone acetonide (KENALOG-40) injection 40 mg  -     lidocaine (XYLOCAINE) 1 % injection 3 mL  -     Joint Aspiration/Injection    2. Dyslipidemia  • No recent fasting lipid panel.  Discussed obtaining routine labs as it has been several years since he has done so.  • He generally has labs every few months with urology and plans for this in October, he will obtain his routine labs along with those.       New Medications Ordered This Visit   Medications   • dexamethasone (DECADRON) injection 4 mg   • triamcinolone acetonide (KENALOG-40) injection 40 mg   • lidocaine (XYLOCAINE) 1 % injection 3 mL     Health Maintenance  • Consider colorectal cancer screening.  • Consider Tdap if none completed in the last 10 years.  • Consider Shingrix vaccine series.  • Consider pneumococcal vaccine.    Follow Up   Return in about 3 months (around 8/23/2023), or if symptoms worsen or fail to improve.    Patient was given instructions and counseling regarding his condition or for health maintenance advice. Please see specific information pulled into the AVS if appropriate.       This document has been electronically signed by MENDEL Dillon   May 24, 2023 07:25 EDT    Dictated Utilizing Dragon Dictation: Part of this note may be an electronic transcription/translation of spoken language to printed text using the Dragon Dictation System.

## 2023-05-24 PROBLEM — M17.11 PRIMARY OSTEOARTHRITIS OF RIGHT KNEE: Status: ACTIVE | Noted: 2023-05-24

## 2023-05-29 DIAGNOSIS — E29.1 HYPOGONADISM IN MALE: ICD-10-CM

## 2023-05-30 RX ORDER — TESTOSTERONE CYPIONATE 200 MG/ML
INJECTION, SOLUTION INTRAMUSCULAR
Qty: 4 ML | OUTPATIENT
Start: 2023-05-30

## 2023-05-31 ENCOUNTER — TELEPHONE (OUTPATIENT)
Dept: UROLOGY | Facility: CLINIC | Age: 73
End: 2023-05-31

## 2023-05-31 NOTE — TELEPHONE ENCOUNTER
Patient called in. He stated that Pharmacy was told that prescription for testosterone has reached plan coverage for insurance. Patient stated that Alexandre has copy of rejection letter

## 2023-05-31 NOTE — TELEPHONE ENCOUNTER
Routing to Francisco J-  He said that if this meant his insurance would no longer cover it the patient would just have to pay for it if he wanted to continue TRT. I called the pt and recommended [facility] and see if that would take anything off his 50 dollar total.

## 2023-06-05 ENCOUNTER — PATIENT ROUNDING (BHMG ONLY) (OUTPATIENT)
Dept: FAMILY MEDICINE CLINIC | Facility: CLINIC | Age: 73
End: 2023-06-05
Payer: COMMERCIAL

## 2023-06-05 NOTE — PROGRESS NOTES
June 5, 2023    Hello, may I speak with Ulices Morrissey?    My name is Jelena Millard    I am  with [facility]  [address] [place] [id number] [phone].    Before we get started may I verify your date of birth? 1950    I am calling to officially welcome you to our practice and ask about your recent visit. Is this a good time to talk? yes    Tell me about your visit with us. What things went well?  All went very well, thank you       We're always looking for ways to make our patients' experiences even better. Do you have recommendations on ways we may improve?  no    Overall were you satisfied with your first visit to our practice? yes       I appreciate you taking the time to speak with me today. Is there anything else I can do for you? no      Thank you, and have a great day.

## 2023-10-05 ENCOUNTER — OFFICE VISIT (OUTPATIENT)
Dept: UROLOGY | Facility: CLINIC | Age: 73
End: 2023-10-05
Payer: COMMERCIAL

## 2023-10-05 ENCOUNTER — LAB (OUTPATIENT)
Dept: FAMILY MEDICINE CLINIC | Facility: CLINIC | Age: 73
End: 2023-10-05
Payer: COMMERCIAL

## 2023-10-05 VITALS
WEIGHT: 211.6 LBS | HEART RATE: 80 BPM | DIASTOLIC BLOOD PRESSURE: 81 MMHG | HEIGHT: 71 IN | SYSTOLIC BLOOD PRESSURE: 146 MMHG | BODY MASS INDEX: 29.62 KG/M2

## 2023-10-05 DIAGNOSIS — E29.1 HYPOGONADISM IN MALE: ICD-10-CM

## 2023-10-05 DIAGNOSIS — N52.9 ERECTILE DYSFUNCTION, UNSPECIFIED ERECTILE DYSFUNCTION TYPE: ICD-10-CM

## 2023-10-05 DIAGNOSIS — Z00.00 HEALTH MAINTENANCE EXAMINATION: ICD-10-CM

## 2023-10-05 DIAGNOSIS — N52.02 CORPORO-VENOUS OCCLUSIVE ERECTILE DYSFUNCTION: Primary | ICD-10-CM

## 2023-10-05 PROCEDURE — 84443 ASSAY THYROID STIM HORMONE: CPT | Performed by: PHYSICIAN ASSISTANT

## 2023-10-05 PROCEDURE — 86803 HEPATITIS C AB TEST: CPT | Performed by: PHYSICIAN ASSISTANT

## 2023-10-05 PROCEDURE — 83036 HEMOGLOBIN GLYCOSYLATED A1C: CPT | Performed by: PHYSICIAN ASSISTANT

## 2023-10-05 PROCEDURE — 82670 ASSAY OF TOTAL ESTRADIOL: CPT | Performed by: PHYSICIAN ASSISTANT

## 2023-10-05 PROCEDURE — 80053 COMPREHEN METABOLIC PANEL: CPT | Performed by: PHYSICIAN ASSISTANT

## 2023-10-05 PROCEDURE — 99214 OFFICE O/P EST MOD 30 MIN: CPT | Performed by: UROLOGY

## 2023-10-05 PROCEDURE — 85025 COMPLETE CBC W/AUTO DIFF WBC: CPT | Performed by: PHYSICIAN ASSISTANT

## 2023-10-05 PROCEDURE — 80061 LIPID PANEL: CPT | Performed by: PHYSICIAN ASSISTANT

## 2023-10-05 PROCEDURE — 84403 ASSAY OF TOTAL TESTOSTERONE: CPT | Performed by: PHYSICIAN ASSISTANT

## 2023-10-05 PROCEDURE — 84153 ASSAY OF PSA TOTAL: CPT | Performed by: PHYSICIAN ASSISTANT

## 2023-10-05 RX ORDER — TESTOSTERONE CYPIONATE 200 MG/ML
INJECTION, SOLUTION INTRAMUSCULAR
Qty: 10 ML | Refills: 2 | Status: SHIPPED | OUTPATIENT
Start: 2023-10-05

## 2023-10-05 RX ORDER — SILDENAFIL CITRATE 20 MG/1
TABLET ORAL
Qty: 60 TABLET | Refills: 10 | Status: SHIPPED | OUTPATIENT
Start: 2023-10-05

## 2023-10-05 NOTE — PROGRESS NOTES
"Chief Complaint:      Chief Complaint   Patient presents with    Low Testosterone     Prostate Disorder        HPI:   72 y.o. male patient returns today for follow-up.  He has been on testosterone replacement therapy.  He reports a dramatic improvement in his HARRISON questionnaire: -HARRISON-androgen deficiency in the age male questionnaire. The patient was queried regarding the androgen deficiency in the age male questionnaire.  This is a validated questionnaire that was performed on a set of 314 [place] male physicians. When it was positive it correlated directly with a 94% chance of low testosterone.  Patient indicates there is a decrease in libido or sex drive, a lack of energy, decreased  strength and endurance, a decreased \"enjoyment of life\", sad and grumpy feelings with significant difficulty maintaining erections.  There has also been a recent deterioration regarding work performance. He reports weight loss.  He has good facility and the use of subcutaneous and intramuscular injections as well as comfort level and using the medication in a sterile fashion.  He understands he should use only the prescribed dose.  He is here for appropriate lab monitoring regarding this.  He understands this is a controlled substance and therefore must be watched closely, will not be refilled in the medical loss or miscalculation of the dose.  He is very happy with the treatment and therefore wants to continue it.    Past Medical History:     Past Medical History:   Diagnosis Date    Arthritis     Dyslipidemia     Fatigue     Hyperlipidemia     Lower back pain     Lumbar strain     Myalgia     Rib pain on right side     Rotator cuff injury 1993    bucked of horse       Current Meds:     Current Outpatient Medications   Medication Sig Dispense Refill    anastrozole (ARIMIDEX) 1 MG tablet Take 1 tablet by mouth 1 (One) Time Per Week. For 12 weeks refills in case another round is needed in the future. 12 tablet 3    finasteride " (PROSCAR) 5 MG tablet Take 1 tablet by mouth Daily. 30 tablet 5    methocarbamol (ROBAXIN) 750 MG tablet Take 1 tablet by mouth 2 (Two) Times a Day. 60 tablet 5    naproxen (NAPROSYN) 500 MG tablet Take 1 tablet by mouth 2 (Two) Times a Day With Meals. 90 tablet 3    sildenafil (REVATIO) 20 MG tablet As directed 60 tablet 10    Syringe, Disposable, 3 ML misc Use 3 ml syringe with a 25 gauge  5/8 inch needle 24 each 6    tamsulosin (Flomax) 0.4 MG capsule 24 hr capsule Take 1 capsule by mouth 2 (two) times a day. 60 capsule 5    Testosterone Cypionate (Depo-Testosterone) 200 MG/ML injection He is to use 1/2 cc every Monday and Thursday SQ 10 mL 2     No current facility-administered medications for this visit.        Allergies:      Allergies   Allergen Reactions    Sulfa Antibiotics         Past Surgical History:     No past surgical history on file.    Social History:     Social History     Socioeconomic History    Marital status:    Tobacco Use    Smoking status: Never    Smokeless tobacco: Current     Types: Snuff   Vaping Use    Vaping Use: Never used   Substance and Sexual Activity    Alcohol use: No    Drug use: No    Sexual activity: Defer       Family History:     Family History   Family history unknown: Yes       Review of Systems:     Review of Systems   Constitutional: Negative.    HENT: Negative.     Eyes: Negative.    Respiratory: Negative.     Cardiovascular: Negative.    Gastrointestinal: Negative.    Endocrine: Negative.    Musculoskeletal: Negative.    Allergic/Immunologic: Negative.    Neurological: Negative.    Hematological: Negative.    Psychiatric/Behavioral: Negative.       Physical Exam:     Physical Exam  Vitals and nursing note reviewed.   Constitutional:       Appearance: He is well-developed.   HENT:      Head: Normocephalic and atraumatic.   Eyes:      Conjunctiva/sclera: Conjunctivae normal.      Pupils: Pupils are equal, round, and reactive to light.   Cardiovascular:      Rate  and Rhythm: Normal rate and regular rhythm.      Heart sounds: Normal heart sounds.   Pulmonary:      Effort: Pulmonary effort is normal.      Breath sounds: Normal breath sounds.   Abdominal:      General: Bowel sounds are normal.      Palpations: Abdomen is soft.   Musculoskeletal:         General: Normal range of motion.      Cervical back: Normal range of motion.   Skin:     General: Skin is warm and dry.   Neurological:      Mental Status: He is alert and oriented to person, place, and time.      Deep Tendon Reflexes: Reflexes are normal and symmetric.   Psychiatric:         Behavior: Behavior normal.         Thought Content: Thought content normal.         Judgment: Judgment normal.       I have reviewed the following portions of the patient's history: Allergies, current medications, past family history, past medical history, past social history, past surgical history, problem list, and ROS and confirm it is accurate.    Recent Image (CT and/or KUB):      CT Abdomen and Pelvis: No results found for this or any previous visit.       CT Stone Protocol: No results found for this or any previous visit.       KUB: No results found for this or any previous visit.       Labs (past 3 months):      No visits with results within 3 Month(s) from this visit.   Latest known visit with results is:   Office Visit on 04/04/2023   Component Date Value Ref Range Status    Testosterone, Total 04/04/2023 >1,500.00 (H)  193.00 - 740.00 ng/dL Final    Estradiol 04/04/2023 80.8  pg/mL Final    WBC 04/04/2023 6.62  3.40 - 10.80 10*3/mm3 Final    RBC 04/04/2023 4.77  4.14 - 5.80 10*6/mm3 Final    Hemoglobin 04/04/2023 14.9  13.0 - 17.7 g/dL Final    Hematocrit 04/04/2023 43.3  37.5 - 51.0 % Final    MCV 04/04/2023 90.8  79.0 - 97.0 fL Final    MCH 04/04/2023 31.2  26.6 - 33.0 pg Final    MCHC 04/04/2023 34.4  31.5 - 35.7 g/dL Final    RDW 04/04/2023 11.4 (L)  12.3 - 15.4 % Final    RDW-SD 04/04/2023 37.7  37.0 - 54.0 fl Final    MPV  04/04/2023 11.4  6.0 - 12.0 fL Final    Platelets 04/04/2023 232  140 - 450 10*3/mm3 Final    PSA 04/04/2023 1.250  0.000 - 4.000 ng/mL Final        Procedure:       Assessment/Plan:   Low testosterone: Patient is here for follow-up.  Since beginning the medication, he has been very pleased.  He reports a dramatic improvement in his erections, ability to achieve and maintain an erection, improvement in libido, increase in frequency of morning erections, and a noticeable weight loss consistent with the treatment.   He is going to have appropriate safety laboratory parameters checked.   He understands that the new data implicates testosterone with the development of prostate cancer and this is all but been disproven and the medical literature as well as the risks of cardiovascular disease which has actually also been disproven.  He understands that while he is a candidate for topical therapy if he is in contact with children this is not an option because it has been shown to accentuate genitalia development at an early age that is frequently irreversible.  He also understands this it is a controlled substance and as such will not be prescribed without appropriate follow-up and appropriate laboratory investigation.  He understands effects on spermatogenesis including the fact that this is not always completely reversible and not always completely limited his ability to father a child.  He has demonstrated facility in the technique of both intramuscular and subcutaneous injection and has been taught sterility when drawing up the medication.    Erectile dysfunction-we discussed the anatomy and physiology of the penis and the endothelium.  We discussed the various forms of erectile dysfunction including peripheral vascular occlusive disease, postoperative, secondary to radiation treatments of the prostate, and arterial inflow.  We discussed the various treatment options available including oral medication and its various  forms.  We discussed the use of both generic and non-generic Viagra.  We discussed Cialis and a longer half-life of 17 hours as well as the other 2 medications.  We discussed cost involved with this including the fact that the generic is much cheaper but is taken as multiple pills because they are 20 mg dosages.  We did discuss the other alternatives including penile injections, vacuum erection devices, and surgical intervention reserved for only the most severe cases.  We discussed the need for testosterone in about 20% of cases of erectile dysfunction.  Continue PDE-5 inhibition    PSA testing-I am recommending a PSA blood test that stands for prostate specific antigen.  I discussed the pathophysiology of PSA testing indicating its use in the diagnosis and management of prostate cancer.  I discussed the normal range being 0 to 4, but more appropriately being much closer to 0 to 2 in a normal male.  I discussed the fact that after a certain age we don't recommend PSA testing especially in view of numerous comorbidities, that this will not be a useful test.  I discussed many of the things that can artificially raise PSA including a recent infection, urinary tract infection, and recent sexual intercourse, or even the type of movement such as manipulation of the prostate from riding a bicycle.  After all this is taken into account when the test is reviewed, the most important use of PSA is the velocity measurement.  In other words, the change of PSA with time is a very important factor in the use and that we look for greater than 20% rise over a year to help us make the prediction of prostate cancer.  I also discussed that the use with prostate cancer indicating that after a radical prostatectomy, the PSA should be 0 and any rise indicates an early biochemical recurrence.    Hyperestrogenism-we spoke about the role of estrogen metabolism and breakdown in the  presence of testosterone replacement therapy.  We spoke  about how high estradiol levels can interfere with the improvement noted in a man on testosterone as well as significant side effects such as pseudogynecomastia.  We discussed the use of the medication Arimidex used in an off label setting and using a very judicious low-dose fashion to prevent too low of an estradiol which would precipitate bone complications.  Going to check an estradiol level.  He is at higher risk for breast problems due to his replacement    Polycythemia-I am going to check a CBC to rule out hemoglobin changes.  We utilized the American Heart Association guidelines for polycythemia which is a hemoglobin greater than 18 and a hematocrit greater than 54.5.  Recommend therapeutic phlebotomy as the treatment.  It is important that we indicate that is the most likely cause of the polycythemia.  We also discussed the possibility of decreasing the dose of testosterone and of stopping it altogether.    Controlled substance-he understands this is a controlled substance and as such is regulated under the state.  I cannot refill it outside the prescribed window.  I stressed the importance of follow-up and appropriate laboratory parameters monitoring.    Liver function tests-according to the AUA guidelines we will not check liver functions due to the fact this is not an oral alkylated testosterone        This document has been electronically signed by OVIDIO ESTEVEZ MD October 5, 2023 08:02 EDT    Dictated Utilizing Dragon Dictation: Part of this note may be an electronic transcription/translation of spoken language to printed text using the Dragon Dictation System.

## 2023-10-06 LAB
ALBUMIN SERPL-MCNC: 4.5 G/DL (ref 3.5–5.2)
ALBUMIN/GLOB SERPL: 1.7 G/DL
ALP SERPL-CCNC: 79 U/L (ref 39–117)
ALT SERPL W P-5'-P-CCNC: 17 U/L (ref 1–41)
ANION GAP SERPL CALCULATED.3IONS-SCNC: 10.4 MMOL/L (ref 5–15)
AST SERPL-CCNC: 23 U/L (ref 1–40)
BASOPHILS # BLD AUTO: 0.07 10*3/MM3 (ref 0–0.2)
BASOPHILS NFR BLD AUTO: 1.1 % (ref 0–1.5)
BILIRUB SERPL-MCNC: 0.4 MG/DL (ref 0–1.2)
BUN SERPL-MCNC: 25 MG/DL (ref 8–23)
BUN/CREAT SERPL: 21.7 (ref 7–25)
CALCIUM SPEC-SCNC: 9.7 MG/DL (ref 8.6–10.5)
CHLORIDE SERPL-SCNC: 102 MMOL/L (ref 98–107)
CHOLEST SERPL-MCNC: 160 MG/DL (ref 0–200)
CO2 SERPL-SCNC: 25.6 MMOL/L (ref 22–29)
CREAT SERPL-MCNC: 1.15 MG/DL (ref 0.76–1.27)
DEPRECATED RDW RBC AUTO: 38.7 FL (ref 37–54)
EGFRCR SERPLBLD CKD-EPI 2021: 68.5 ML/MIN/1.73
EOSINOPHIL # BLD AUTO: 0.09 10*3/MM3 (ref 0–0.4)
EOSINOPHIL NFR BLD AUTO: 1.4 % (ref 0.3–6.2)
ERYTHROCYTE [DISTWIDTH] IN BLOOD BY AUTOMATED COUNT: 11.8 % (ref 12.3–15.4)
ESTRADIOL SERPL HS-MCNC: 57.8 PG/ML
GLOBULIN UR ELPH-MCNC: 2.7 GM/DL
GLUCOSE SERPL-MCNC: 90 MG/DL (ref 65–99)
HBA1C MFR BLD: 5.5 % (ref 4.8–5.6)
HCT VFR BLD AUTO: 43.7 % (ref 37.5–51)
HCV AB SER DONR QL: NORMAL
HDLC SERPL-MCNC: 65 MG/DL (ref 40–60)
HGB BLD-MCNC: 15 G/DL (ref 13–17.7)
IMM GRANULOCYTES # BLD AUTO: 0.07 10*3/MM3 (ref 0–0.05)
IMM GRANULOCYTES NFR BLD AUTO: 1.1 % (ref 0–0.5)
LDLC SERPL CALC-MCNC: 84 MG/DL (ref 0–100)
LDLC/HDLC SERPL: 1.3 {RATIO}
LYMPHOCYTES # BLD AUTO: 1.2 10*3/MM3 (ref 0.7–3.1)
LYMPHOCYTES NFR BLD AUTO: 19.1 % (ref 19.6–45.3)
MCH RBC QN AUTO: 30.7 PG (ref 26.6–33)
MCHC RBC AUTO-ENTMCNC: 34.3 G/DL (ref 31.5–35.7)
MCV RBC AUTO: 89.5 FL (ref 79–97)
MONOCYTES # BLD AUTO: 0.54 10*3/MM3 (ref 0.1–0.9)
MONOCYTES NFR BLD AUTO: 8.6 % (ref 5–12)
NEUTROPHILS NFR BLD AUTO: 4.32 10*3/MM3 (ref 1.7–7)
NEUTROPHILS NFR BLD AUTO: 68.7 % (ref 42.7–76)
NRBC BLD AUTO-RTO: 0 /100 WBC (ref 0–0.2)
PLATELET # BLD AUTO: 242 10*3/MM3 (ref 140–450)
PMV BLD AUTO: 11.1 FL (ref 6–12)
POTASSIUM SERPL-SCNC: 4.7 MMOL/L (ref 3.5–5.2)
PROT SERPL-MCNC: 7.2 G/DL (ref 6–8.5)
PSA SERPL-MCNC: 1.04 NG/ML (ref 0–4)
RBC # BLD AUTO: 4.88 10*6/MM3 (ref 4.14–5.8)
SODIUM SERPL-SCNC: 138 MMOL/L (ref 136–145)
TESTOST SERPL-MCNC: >1500 NG/DL (ref 193–740)
TRIGL SERPL-MCNC: 53 MG/DL (ref 0–150)
TSH SERPL DL<=0.05 MIU/L-ACNC: 3.29 UIU/ML (ref 0.27–4.2)
VLDLC SERPL-MCNC: 11 MG/DL (ref 5–40)
WBC NRBC COR # BLD: 6.29 10*3/MM3 (ref 3.4–10.8)

## 2023-10-09 ENCOUNTER — OFFICE VISIT (OUTPATIENT)
Dept: FAMILY MEDICINE CLINIC | Facility: CLINIC | Age: 73
End: 2023-10-09
Payer: COMMERCIAL

## 2023-10-09 VITALS
HEART RATE: 71 BPM | TEMPERATURE: 97.7 F | HEIGHT: 71 IN | DIASTOLIC BLOOD PRESSURE: 85 MMHG | BODY MASS INDEX: 29.88 KG/M2 | OXYGEN SATURATION: 95 % | SYSTOLIC BLOOD PRESSURE: 150 MMHG | WEIGHT: 213.4 LBS

## 2023-10-09 DIAGNOSIS — M17.11 PRIMARY OSTEOARTHRITIS OF RIGHT KNEE: Primary | ICD-10-CM

## 2023-10-09 DIAGNOSIS — Z12.11 SCREEN FOR COLON CANCER: ICD-10-CM

## 2023-10-09 PROCEDURE — 96372 THER/PROPH/DIAG INJ SC/IM: CPT | Performed by: PHYSICIAN ASSISTANT

## 2023-10-09 PROCEDURE — 90677 PCV20 VACCINE IM: CPT | Performed by: PHYSICIAN ASSISTANT

## 2023-10-09 PROCEDURE — 90662 IIV NO PRSV INCREASED AG IM: CPT | Performed by: PHYSICIAN ASSISTANT

## 2023-10-09 PROCEDURE — 20610 DRAIN/INJ JOINT/BURSA W/O US: CPT | Performed by: PHYSICIAN ASSISTANT

## 2023-10-09 PROCEDURE — 90471 IMMUNIZATION ADMIN: CPT | Performed by: PHYSICIAN ASSISTANT

## 2023-10-09 PROCEDURE — 99214 OFFICE O/P EST MOD 30 MIN: CPT | Performed by: PHYSICIAN ASSISTANT

## 2023-10-09 PROCEDURE — 90472 IMMUNIZATION ADMIN EACH ADD: CPT | Performed by: PHYSICIAN ASSISTANT

## 2023-10-09 RX ORDER — TRIAMCINOLONE ACETONIDE 40 MG/ML
40 INJECTION, SUSPENSION INTRA-ARTICULAR; INTRAMUSCULAR ONCE
Status: COMPLETED | OUTPATIENT
Start: 2023-10-09 | End: 2023-10-09

## 2023-10-09 RX ORDER — LIDOCAINE HYDROCHLORIDE 10 MG/ML
3 INJECTION, SOLUTION INFILTRATION; PERINEURAL ONCE
Status: COMPLETED | OUTPATIENT
Start: 2023-10-09 | End: 2023-10-09

## 2023-10-09 RX ORDER — DEXAMETHASONE SODIUM PHOSPHATE 4 MG/ML
4 INJECTION, SOLUTION INTRA-ARTICULAR; INTRALESIONAL; INTRAMUSCULAR; INTRAVENOUS; SOFT TISSUE ONCE
Status: COMPLETED | OUTPATIENT
Start: 2023-10-09 | End: 2023-10-09

## 2023-10-09 RX ADMIN — LIDOCAINE HYDROCHLORIDE 3 ML: 10 INJECTION, SOLUTION INFILTRATION; PERINEURAL at 09:23

## 2023-10-09 RX ADMIN — TRIAMCINOLONE ACETONIDE 40 MG: 40 INJECTION, SUSPENSION INTRA-ARTICULAR; INTRAMUSCULAR at 09:24

## 2023-10-09 RX ADMIN — DEXAMETHASONE SODIUM PHOSPHATE 4 MG: 4 INJECTION, SOLUTION INTRA-ARTICULAR; INTRALESIONAL; INTRAMUSCULAR; INTRAVENOUS; SOFT TISSUE at 09:24

## 2023-10-09 NOTE — PROGRESS NOTES
Subjective        Chief Complaint  Knee pain    Subjective      History of Present Illness  Ulices Morrissey is a 72 y.o. male who presents today to [facility] for knee pain. He has a past medical history of Arthritis, Hyperlipidemia, low testosterone, BPH, chronic prostatitis, and Rotator cuff injury (1993).    Knee pain:   Osteoarthritis:   Ulices reports a long history of arthritis in the right knee.  He runs and walks for exercise and had a corticosteroid injection about 5 months ago with significant improvement noted.  He has recently noted some increase in pain and stiffness in the right knee and would like to repeat the injection. No previous surgeries reported.    Dyslipidemia:  Recent FLP showed good control off of medication.  See below.        Low testosterone:  Chronic prostatitis:  BPH:  ED:  Currently following with [facility] urology, Dr. Marx.  He is managed on tamsulosin, finasteride, anastrozole, and sildenafil.  Recent PSA was within normal limits and this is checked every 6 months.      Current Outpatient Medications:     anastrozole (ARIMIDEX) 1 MG tablet, Take 1 tablet by mouth 1 (One) Time Per Week. For 12 weeks refills in case another round is needed in the future., Disp: 12 tablet, Rfl: 3    finasteride (PROSCAR) 5 MG tablet, Take 1 tablet by mouth Daily., Disp: 30 tablet, Rfl: 5    methocarbamol (ROBAXIN) 750 MG tablet, Take 1 tablet by mouth 2 (Two) Times a Day., Disp: 60 tablet, Rfl: 5    naproxen (NAPROSYN) 500 MG tablet, Take 1 tablet by mouth 2 (Two) Times a Day With Meals., Disp: 90 tablet, Rfl: 3    sildenafil (REVATIO) 20 MG tablet, As directed, Disp: 60 tablet, Rfl: 10    Syringe, Disposable, 3 ML misc, Use 3 ml syringe with a 25 gauge  5/8 inch needle, Disp: 24 each, Rfl: 6    tamsulosin (Flomax) 0.4 MG capsule 24 hr capsule, Take 1 capsule by mouth 2 (two) times a day., Disp: 60 capsule, Rfl: 5    Testosterone Cypionate (Depo-Testosterone)  "200 MG/ML injection, He is to use 1/2 cc every Monday and Thursday SQ, Disp: 10 mL, Rfl: 2  No current facility-administered medications for this visit.      Allergies   Allergen Reactions    Sulfa Antibiotics      Objective     Objective   Vital Signs:  /85   Pulse 71   Temp 97.7 øF (36.5 øC) (Temporal)   Ht 180.3 cm (70.98\")   Wt 96.8 kg (213 lb 6.4 oz)   SpO2 95%   BMI 29.78 kg/mý   Estimated body mass index is 29.78 kg/mý as calculated from the following:    Height as of this encounter: 180.3 cm (70.98\").    Weight as of this encounter: 96.8 kg (213 lb 6.4 oz).    Past Medical History:   Diagnosis Date    Arthritis     Dyslipidemia     Fatigue     Hyperlipidemia     Lower back pain     Lumbar strain     Myalgia     Rib pain on right side     Rotator cuff injury 1993    bucked of horse     History reviewed. No pertinent surgical history.  Social History     Socioeconomic History    Marital status:    Tobacco Use    Smoking status: Never    Smokeless tobacco: Current     Types: Snuff   Vaping Use    Vaping Use: Never used   Substance and Sexual Activity    Alcohol use: No    Drug use: No    Sexual activity: Defer      Physical Exam  Vitals and nursing note reviewed.   Constitutional:       General: He is not in acute distress.     Appearance: He is well-developed. He is not diaphoretic.   HENT:      Head: Normocephalic and atraumatic.   Eyes:      General: No scleral icterus.        Right eye: No discharge.         Left eye: No discharge.      Conjunctiva/sclera: Conjunctivae normal.   Cardiovascular:      Rate and Rhythm: Normal rate and regular rhythm.      Heart sounds: Normal heart sounds. No murmur heard.     No friction rub. No gallop.   Pulmonary:      Effort: Pulmonary effort is normal. No respiratory distress.      Breath sounds: Normal breath sounds. No wheezing or rales.   Chest:      Chest wall: No tenderness.   Musculoskeletal:         General: No swelling or tenderness. Normal " range of motion.      Cervical back: Normal range of motion and neck supple.      Comments: There is some slight crepitus with movement of the patella.  No obvious joint effusion.  No redness or warmth.   Skin:     General: Skin is warm and dry.      Coloration: Skin is not pale.      Findings: No erythema or rash.   Neurological:      Mental Status: He is alert and oriented to person, place, and time.   Psychiatric:         Behavior: Behavior normal.        Result Review :  The following data was reviewed by: MENDEL Dillon on 05/23/2023:  Hemoglobin A1C   Date Value Ref Range Status   10/05/2023 5.50 4.80 - 5.60 % Final     TSH   Date Value Ref Range Status   10/05/2023 3.290 0.270 - 4.200 uIU/mL Final     HDL Cholesterol   Date Value Ref Range Status   10/05/2023 65 (H) 40 - 60 mg/dL Final     LDL Cholesterol    Date Value Ref Range Status   10/05/2023 84 0 - 100 mg/dL Final     Triglycerides   Date Value Ref Range Status   10/05/2023 53 0 - 150 mg/dL Final     Total Cholesterol   Date Value Ref Range Status   10/11/2016 213 (H) 0 - 200 mg/dL Final     Comment:     Cholesterol Reference Ranges:   Desirable       < 200 mg/dL   Borderline    200-239 mg/dL   High Risk       > 239 mg/dL  Triglyceride Reference Ranges:   Normal          < 150 mg/dL   Borderline    150-199 mg/dL   High          200-499 mg/dL   Very High       > 499 mg/dL  HDL Reference Ranges:   Low              < 40 mg/dL   High             > 59 mg/dL  LDL Reference Ranges:   Optimal         < 100 mg/dL   Near Optimal  100-129 mg/dL   Borderline    130-159 mg/dL   High          160-189 mg/dL   Very High       > 189 mg/dL           Procedure: Right knee intra-articular injection  The procedure risks and benefits were explained to patient and the patient gave verbal consent to have the procedure performed.  Indication: acute exacerbation of osteoarthritis of knee  Provider: Cira Aaron PA-C  Description: The right knee was prepped and draped  in sterile fashion.  An injection was given into the right knee using 3 cc of 1% lidocaine, 1 cc Decadron and 1 cc of triamcinolone acetate using the anterior approach.  Pressure was held and a sterile dressing was placed.  No consultations  Estimated blood loss: Minimal  Patient tolerated the procedure well.     1% lidocaine 500 mg per 50 mL NDC # [id number] Exp 10/31/2026  Triamcinolone acetate 40 mg per 1 mL NDC#[id number] Exp 12/2025  Decadron 4 mg per 1 mL NDC #[id number] Exp 05/2024    Assessment / Plan         Assessment   Diagnoses and all orders for this visit:    1. Primary osteoarthritis of right knee (Primary)  Ulices has requested a steroid injection into his right knee.  Risks/benefits of joint injection discussed with the patient today.  He expressed understanding and agreed.  See procedure note above.  Patient tolerated the procedure well without any immediate complications.  He was encouraged to rest the knee over the next 48 hours, ice for 15 to 20 minutes 3 times a day.  Can take Tylenol/naproxen as needed for pain.  Gradually reintroduce activity as tolerated.   Call with concerns.  -     dexamethasone (DECADRON) injection 4 mg  -     triamcinolone acetonide (KENALOG-40) injection 40 mg  -     lidocaine (XYLOCAINE) 1 % injection 3 mL  -     Joint Aspiration/Injection    2. Dyslipidemia  Controlled off on medical therapy.   Continue regular diet, fiber supplement, and exercise.        New Medications Ordered This Visit   Medications    dexAMETHasone (DECADRON) injection 4 mg    triamcinolone acetonide (KENALOG-40) injection 40 mg    lidocaine (XYLOCAINE) 1 % injection 3 mL     Health Maintenance  Referred to Dr. Gonsales for updated colorectal cancer screening.  Consider Tdap if none completed in the last 10 years.  Consider Shingrix vaccine series.  Pneumococcal and influenza vaccines given today.     Follow Up   Return in about 6 months (around 4/9/2024).    Patient was given instructions and  counseling regarding his condition or for health maintenance advice. Please see specific information pulled into the AVS if appropriate.       This document has been electronically signed by MENDEL Dillon   October 9, 2023 09:33 EDT    Dictated Utilizing Dragon Dictation: Part of this note may be an electronic transcription/translation of spoken language to printed text using the Dragon Dictation System.

## 2023-11-08 ENCOUNTER — OFFICE VISIT (OUTPATIENT)
Dept: FAMILY MEDICINE CLINIC | Facility: CLINIC | Age: 73
End: 2023-11-08
Payer: COMMERCIAL

## 2023-11-08 VITALS
DIASTOLIC BLOOD PRESSURE: 90 MMHG | TEMPERATURE: 98 F | HEART RATE: 92 BPM | WEIGHT: 216.2 LBS | HEIGHT: 71 IN | OXYGEN SATURATION: 97 % | BODY MASS INDEX: 30.27 KG/M2 | SYSTOLIC BLOOD PRESSURE: 150 MMHG

## 2023-11-08 DIAGNOSIS — I10 PRIMARY HYPERTENSION: Primary | ICD-10-CM

## 2023-11-08 RX ORDER — LOSARTAN POTASSIUM 25 MG/1
25 TABLET ORAL DAILY
Qty: 30 TABLET | Refills: 3 | Status: SHIPPED | OUTPATIENT
Start: 2023-11-08

## 2023-11-08 NOTE — PATIENT INSTRUCTIONS
"Low-Sodium Eating Plan  Sodium, which is an element that makes up salt, helps you maintain a healthy balance of fluids in your body. Too much sodium can increase your blood pressure and cause fluid and waste to be held in your body.  Your health care provider or dietitian may recommend following this plan if you have high blood pressure (hypertension), kidney disease, liver disease, or heart failure. Eating less sodium can help lower your blood pressure, reduce swelling, and protect your heart, liver, and kidneys.  What are tips for following this plan?  Reading food labels  The Nutrition Facts label lists the amount of sodium in one serving of the food. If you eat more than one serving, you must multiply the listed amount of sodium by the number of servings.  Choose foods with less than 140 mg of sodium per serving.  Avoid foods with 300 mg of sodium or more per serving.  Shopping    Look for lower-sodium products, often labeled as \"low-sodium\" or \"no salt added.\"  Always check the sodium content, even if foods are labeled as \"unsalted\" or \"no salt added.\"  Buy fresh foods.  Avoid canned foods and pre-made or frozen meals.  Avoid canned, cured, or processed meats.  Buy breads that have less than 80 mg of sodium per slice.  Cooking    Eat more home-cooked food and less restaurant, buffet, and fast food.  Avoid adding salt when cooking. Use salt-free seasonings or herbs instead of table salt or sea salt. Check with your health care provider or pharmacist before using salt substitutes.  Cook with plant-based oils, such as canola, sunflower, or olive oil.  Meal planning  When eating at a restaurant, ask that your food be prepared with less salt or no salt, if possible. Avoid dishes labeled as brined, pickled, cured, smoked, or made with soy sauce, miso, or teriyaki sauce.  Avoid foods that contain MSG (monosodium glutamate). MSG is sometimes added to Chinese food, bouillon, and some canned foods.  Make meals that can " "be grilled, baked, poached, roasted, or steamed. These are generally made with less sodium.  General information  Most people on this plan should limit their sodium intake to 1,500-2,000 mg (milligrams) of sodium each day.  What foods should I eat?  Fruits  Fresh, frozen, or canned fruit. Fruit juice.  Vegetables  Fresh or frozen vegetables. \"No salt added\" canned vegetables. \"No salt added\" tomato sauce and paste. Low-sodium or reduced-sodium tomato and vegetable juice.  Grains  Low-sodium cereals, including oats, puffed wheat and rice, and shredded wheat. Low-sodium crackers. Unsalted rice. Unsalted pasta. Low-sodium bread. Whole-grain breads and whole-grain pasta.  Meats and other proteins  Fresh or frozen (no salt added) meat, poultry, seafood, and fish. Low-sodium canned tuna and salmon. Unsalted nuts. Dried peas, beans, and lentils without added salt. Unsalted canned beans. Eggs. Unsalted nut butters.  Dairy  Milk. Soy milk. Cheese that is naturally low in sodium, such as ricotta cheese, fresh mozzarella, or Swiss cheese. Low-sodium or reduced-sodium cheese. Cream cheese. Yogurt.  Seasonings and condiments  Fresh and dried herbs and spices. Salt-free seasonings. Low-sodium mustard and ketchup. Sodium-free salad dressing. Sodium-free light mayonnaise. Fresh or refrigerated horseradish. Lemon juice. Vinegar.  Other foods  Homemade, reduced-sodium, or low-sodium soups. Unsalted popcorn and pretzels. Low-salt or salt-free chips.  The items listed above may not be a complete list of foods and beverages you can eat. Contact a dietitian for more information.  What foods should I avoid?  Vegetables  Sauerkraut, pickled vegetables, and relishes. Olives. French fries. Onion rings. Regular canned vegetables (not low-sodium or reduced-sodium). Regular canned tomato sauce and paste (not low-sodium or reduced-sodium). Regular tomato and vegetable juice (not low-sodium or reduced-sodium). Frozen vegetables in " sauces.  Grains  Instant hot cereals. Bread stuffing, pancake, and biscuit mixes. Croutons. Seasoned rice or pasta mixes. Noodle soup cups. Boxed or frozen macaroni and cheese. Regular salted crackers. Self-rising flour.  Meats and other proteins  Meat or fish that is salted, canned, smoked, spiced, or pickled. Precooked or cured meat, such as sausages or meat loaves. [place]. Ham. Pepperoni. Hot dogs. Corned beef. Chipped beef. Salt pork. Jerky. Pickled herring. Anchovies and sardines. Regular canned tuna. Salted nuts.  Dairy  Processed cheese and cheese spreads. Hard cheeses. Cheese curds. Blue cheese. Feta cheese. String cheese. Regular cottage cheese. Buttermilk. Canned milk.  Fats and oils  Salted butter. Regular margarine. Ghee. [place] fat.  Seasonings and condiments  Onion salt, garlic salt, seasoned salt, table salt, and sea salt. Canned and packaged gravies. Worcestershire sauce. Tartar sauce. Barbecue sauce. Teriyaki sauce. Soy sauce, including reduced-sodium. Steak sauce. Fish sauce. Oyster sauce. Cocktail sauce. Horseradish that you find on the shelf. Regular ketchup and mustard. Meat flavorings and tenderizers. Bouillon cubes. Hot sauce. Pre-made or packaged marinades. Pre-made or packaged taco seasonings. Relishes. Regular salad dressings. Salsa.  Other foods  Salted popcorn and pretzels. Corn chips and puffs. Potato and tortilla chips. Canned or dried soups. Pizza. Frozen entrees and pot pies.  The items listed above may not be a complete list of foods and beverages you should avoid. Contact a dietitian for more information.  Summary  Eating less sodium can help lower your blood pressure, reduce swelling, and protect your heart, liver, and kidneys.  Most people on this plan should limit their sodium intake to 1,500-2,000 mg (milligrams) of sodium each day.  Canned, boxed, and frozen foods are high in sodium. Restaurant foods, fast foods, and pizza are also very high in sodium. You also get sodium by  adding salt to food.  Try to cook at home, eat more fresh fruits and vegetables, and eat less fast food and canned, processed, or prepared foods.  This information is not intended to replace advice given to you by your health care provider. Make sure you discuss any questions you have with your health care provider.  Document Revised: 01/22/2021 Document Reviewed: 11/18/2020  [employer] Patient Education © 2022 [employer]. Hypertension, Adult  Hypertension is another name for high blood pressure. High blood pressure forces your heart to work harder to pump blood. This can cause problems over time.  There are two numbers in a blood pressure reading. There is a top number (systolic) over a bottom number (diastolic). It is best to have a blood pressure that is below 120/80.  What are the causes?  The cause of this condition is not known. Some other conditions can lead to high blood pressure.  What increases the risk?  Some lifestyle factors can make you more likely to develop high blood pressure:  Smoking.  Not getting enough exercise or physical activity.  Being overweight.  Having too much fat, sugar, calories, or salt (sodium) in your diet.  Drinking too much alcohol.  Other risk factors include:  Having any of these conditions:  Heart disease.  Diabetes.  High cholesterol.  Kidney disease.  Obstructive sleep apnea.  Having a family history of high blood pressure and high cholesterol.  Age. The risk increases with age.  Stress.  What are the signs or symptoms?  High blood pressure may not cause symptoms. Very high blood pressure (hypertensive crisis) may cause:  Headache.  Fast or uneven heartbeats (palpitations).  Shortness of breath.  Nosebleed.  Vomiting or feeling like you may vomit (nauseous).  Changes in how you see.  Very bad chest pain.  Feeling dizzy.  Seizures.  How is this treated?  This condition is treated by making healthy lifestyle changes, such as:  Eating healthy foods.  Exercising more.  Drinking  less alcohol.  Your doctor may prescribe medicine if lifestyle changes do not help enough and if:  Your top number is above 130.  Your bottom number is above 80.  Your personal target blood pressure may vary.  Follow these instructions at home:  Eating and drinking    If told, follow the DASH eating plan. To follow this plan:  Fill one half of your plate at each meal with fruits and vegetables.  Fill one fourth of your plate at each meal with whole grains. Whole grains include whole-wheat pasta, brown rice, and whole-grain bread.  Eat or drink low-fat dairy products, such as skim milk or low-fat yogurt.  Fill one fourth of your plate at each meal with low-fat (lean) proteins. Low-fat proteins include fish, chicken without skin, eggs, beans, and tofu.  Avoid fatty meat, cured and processed meat, or chicken with skin.  Avoid pre-made or processed food.  Limit the amount of salt in your diet to less than 1,500 mg each day.  Do not drink alcohol if:  Your doctor tells you not to drink.  You are pregnant, may be pregnant, or are planning to become pregnant.  If you drink alcohol:  Limit how much you have to:  0-1 drink a day for women.  0-2 drinks a day for men.  Know how much alcohol is in your drink. In the [place] one drink equals one 12 oz bottle of beer (355 mL), one 5 oz glass of wine (148 mL), or one 1½ oz glass of hard liquor (44 mL).  Lifestyle    Work with your doctor to stay at a healthy weight or to lose weight. Ask your doctor what the best weight is for you.  Get at least 30 minutes of exercise that causes your heart to beat faster (aerobic exercise) most days of the week. This may include walking, swimming, or biking.  Get at least 30 minutes of exercise that strengthens your muscles (resistance exercise) at least 3 days a week. This may include lifting weights or doing Pilates.  Do not smoke or use any products that contain nicotine or tobacco. If you need help quitting, ask your doctor.  Check your blood  pressure at home as told by your doctor.  Keep all follow-up visits.  Medicines  Take over-the-counter and prescription medicines only as told by your doctor. Follow directions carefully.  Do not skip doses of blood pressure medicine. The medicine does not work as well if you skip doses. Skipping doses also puts you at risk for problems.  Ask your doctor about side effects or reactions to medicines that you should watch for.  Contact a doctor if:  You think you are having a reaction to the medicine you are taking.  You have headaches that keep coming back.  You feel dizzy.  You have swelling in your ankles.  You have trouble with your vision.  Get help right away if:  You get a very bad headache.  You start to feel mixed up (confused).  You feel weak or numb.  You feel faint.  You have very bad pain in your:  Chest.  Belly (abdomen).  You vomit more than once.  You have trouble breathing.  These symptoms may be an emergency. Get help right away. Call 911.  Do not wait to see if the symptoms will go away.  Do not drive yourself to the hospital.  Summary  Hypertension is another name for high blood pressure.  High blood pressure forces your heart to work harder to pump blood.  For most people, a normal blood pressure is less than 120/80.  Making healthy choices can help lower blood pressure. If your blood pressure does not get lower with healthy choices, you may need to take medicine.  This information is not intended to replace advice given to you by your health care provider. Make sure you discuss any questions you have with your health care provider.  Document Revised: 10/06/2022 Document Reviewed: 10/06/2022  [employer] Patient Education © 2022 [employer]

## 2023-11-08 NOTE — PROGRESS NOTES
Subjective        Chief Complaint  Knee pain    Subjective      History of Present Illness  Ulices Morrissey is a 72 y.o. male who presents today to [facility] for knee pain. He has a past medical history of Arthritis, Hyperlipidemia, low testosterone, BPH, chronic prostatitis, and Rotator cuff injury (1993).    Elevated blood pressure:   Past hx of hypertension.   Ulices reports recently noting elevated blood pressure readings up to 170/100.  He recently had a left-sided cataract extraction and BP was elevated.  They did go ahead and do the procedure, however, he plans to have his right side cataract extracted next week and they recommended blood pressure improvement.  He reports a previous history of hypertension, however, he had significant weight loss with diet and exercise and was able to come off of medication several years ago.  He denies any recent chest pains or discomfort.  No shortness of breath.  He has had a dull headache and some dizziness with quick changes in position.    He does report he had recently been eating a lot of potato chips and eating canned valente beans from [employer].  He takes naproxen approximately 4 days a week.  He is also on testosterone injections. He has not been exercising like he normally dose. He has gained 5 pounds over the last 6 months per our records.       Current Outpatient Medications:     anastrozole (ARIMIDEX) 1 MG tablet, Take 1 tablet by mouth 1 (One) Time Per Week. For 12 weeks refills in case another round is needed in the future., Disp: 12 tablet, Rfl: 3    finasteride (PROSCAR) 5 MG tablet, Take 1 tablet by mouth Daily., Disp: 30 tablet, Rfl: 5    methocarbamol (ROBAXIN) 750 MG tablet, Take 1 tablet by mouth 2 (Two) Times a Day., Disp: 60 tablet, Rfl: 5    naproxen (NAPROSYN) 500 MG tablet, Take 1 tablet by mouth 2 (Two) Times a Day With Meals., Disp: 90 tablet, Rfl: 3    sildenafil (REVATIO) 20 MG tablet, As directed, Disp: 60 tablet,  "Rfl: 10    Syringe, Disposable, 3 ML misc, Use 3 ml syringe with a 25 gauge  5/8 inch needle, Disp: 24 each, Rfl: 6    tamsulosin (Flomax) 0.4 MG capsule 24 hr capsule, Take 1 capsule by mouth 2 (two) times a day., Disp: 60 capsule, Rfl: 5    Testosterone Cypionate (Depo-Testosterone) 200 MG/ML injection, He is to use 1/2 cc every Monday and Thursday SQ, Disp: 10 mL, Rfl: 2    losartan (COZAAR) 25 MG tablet, Take 1 tablet by mouth Daily., Disp: 30 tablet, Rfl: 3      Allergies   Allergen Reactions    Sulfa Antibiotics      Objective     Objective   Vital Signs:  /90   Pulse 92   Temp 98 °F (36.7 °C) (Temporal)   Ht 180.3 cm (70.98\")   Wt 98.1 kg (216 lb 3.2 oz)   SpO2 97%   BMI 30.17 kg/m²   Estimated body mass index is 30.17 kg/m² as calculated from the following:    Height as of this encounter: 180.3 cm (70.98\").    Weight as of this encounter: 98.1 kg (216 lb 3.2 oz).    Past Medical History:   Diagnosis Date    Arthritis     Dyslipidemia     Fatigue     Hyperlipidemia     Lower back pain     Lumbar strain     Myalgia     Rib pain on right side     Rotator cuff injury 1993    bucked of horse     History reviewed. No pertinent surgical history.  Social History     Socioeconomic History    Marital status:    Tobacco Use    Smoking status: Never    Smokeless tobacco: Current     Types: Snuff   Vaping Use    Vaping Use: Never used   Substance and Sexual Activity    Alcohol use: No    Drug use: No    Sexual activity: Defer      Physical Exam  Vitals and nursing note reviewed.   Constitutional:       General: He is not in acute distress.     Appearance: He is well-developed. He is not diaphoretic.   HENT:      Head: Normocephalic and atraumatic.   Eyes:      General: No scleral icterus.        Right eye: No discharge.         Left eye: No discharge.      Conjunctiva/sclera: Conjunctivae normal.   Cardiovascular:      Rate and Rhythm: Normal rate and regular rhythm.      Heart sounds: Normal heart " sounds. No murmur heard.     No friction rub. No gallop.   Pulmonary:      Effort: Pulmonary effort is normal. No respiratory distress.      Breath sounds: Normal breath sounds. No wheezing or rales.   Chest:      Chest wall: No tenderness.   Musculoskeletal:         General: No swelling or tenderness. Normal range of motion.      Cervical back: Normal range of motion and neck supple.   Skin:     General: Skin is warm and dry.      Coloration: Skin is not pale.      Findings: No erythema or rash.   Neurological:      Mental Status: He is alert and oriented to person, place, and time.   Psychiatric:         Behavior: Behavior normal.        Result Review :  The following data was reviewed by: MENDEL Dillon on 05/23/2023:  Hemoglobin A1C   Date Value Ref Range Status   10/05/2023 5.50 4.80 - 5.60 % Final     TSH   Date Value Ref Range Status   10/05/2023 3.290 0.270 - 4.200 uIU/mL Final     HDL Cholesterol   Date Value Ref Range Status   10/05/2023 65 (H) 40 - 60 mg/dL Final     LDL Cholesterol    Date Value Ref Range Status   10/05/2023 84 0 - 100 mg/dL Final     Triglycerides   Date Value Ref Range Status   10/05/2023 53 0 - 150 mg/dL Final     Total Cholesterol   Date Value Ref Range Status   10/11/2016 213 (H) 0 - 200 mg/dL Final     Comment:     Cholesterol Reference Ranges:   Desirable       < 200 mg/dL   Borderline    200-239 mg/dL   High Risk       > 239 mg/dL  Triglyceride Reference Ranges:   Normal          < 150 mg/dL   Borderline    150-199 mg/dL   High          200-499 mg/dL   Very High       > 499 mg/dL  HDL Reference Ranges:   Low              < 40 mg/dL   High             > 59 mg/dL  LDL Reference Ranges:   Optimal         < 100 mg/dL   Near Optimal  100-129 mg/dL   Borderline    130-159 mg/dL   High          160-189 mg/dL   Very High       > 189 mg/dL           Assessment / Plan         Assessment   Diagnoses and all orders for this visit:  1. Primary hypertension  Recent labs performed 1 month  ago reviewed, no concerning abnormalities.  He did have a slight decline in GFR over the last year.  He does report that he may not drink enough water.  We will start losartan 25 mg daily.  Monitor BP at home and call if BP continues to run >140/90.  Avoid sodium in the diet. Increase water intake.   Limit NSAIDs. Avoid OTC cough/cold medications.   We also discussed monitoring potassium intake and avoiding eating a lot of citrus fruits, nuts, and drinking fruit juices given losartan can cause potassium retention.  We will follow-up in 2 weeks for repeat blood pressure check as well as BMP to monitor renal function and potassium.  - losartan (COZAAR) 25 MG tablet; Take 1 tablet by mouth Daily.  Dispense: 30 tablet; Refill: 3  - Basic metabolic panel; Future       New Medications Ordered This Visit   Medications    losartan (COZAAR) 25 MG tablet     Sig: Take 1 tablet by mouth Daily.     Dispense:  30 tablet     Refill:  3     Health Maintenance  Referred to Dr. Gonsales for updated colorectal cancer screening.  Consider Tdap if none completed in the last 10 years.  Consider Shingrix vaccine series.  Pneumococcal and influenza vaccines updated 2023.    Follow Up   Return in about 2 weeks (around 11/22/2023).    Patient was given instructions and counseling regarding his condition or for health maintenance advice. Please see specific information pulled into the AVS if appropriate.       This document has been electronically signed by MENDEL Dillon   November 8, 2023 16:04 EST    Dictated Utilizing Dragon Dictation: Part of this note may be an electronic transcription/translation of spoken language to printed text using the Dragon Dictation System.

## 2023-11-13 ENCOUNTER — TELEPHONE (OUTPATIENT)
Dept: UROLOGY | Facility: CLINIC | Age: 73
End: 2023-11-13
Payer: COMMERCIAL

## 2023-11-13 NOTE — TELEPHONE ENCOUNTER
"Relay     \"I called the pt to let him know that his labs overall were great. However, we did notice that his estradiol level was elevated and what we recommend for this is a round of armidex. The patient should have refills on this medication and in the voicemail I left I told the patient if he needed refills to call us \"                "

## 2023-11-27 ENCOUNTER — OFFICE VISIT (OUTPATIENT)
Dept: FAMILY MEDICINE CLINIC | Facility: CLINIC | Age: 73
End: 2023-11-27
Payer: COMMERCIAL

## 2023-11-27 VITALS
HEIGHT: 71 IN | DIASTOLIC BLOOD PRESSURE: 90 MMHG | SYSTOLIC BLOOD PRESSURE: 160 MMHG | HEART RATE: 73 BPM | TEMPERATURE: 97.5 F | OXYGEN SATURATION: 97 % | BODY MASS INDEX: 31.12 KG/M2 | WEIGHT: 222.32 LBS

## 2023-11-27 DIAGNOSIS — I10 PRIMARY HYPERTENSION: ICD-10-CM

## 2023-11-27 PROCEDURE — 80048 BASIC METABOLIC PNL TOTAL CA: CPT | Performed by: PHYSICIAN ASSISTANT

## 2023-11-28 LAB
ANION GAP SERPL CALCULATED.3IONS-SCNC: 9 MMOL/L (ref 5–15)
BUN SERPL-MCNC: 18 MG/DL (ref 8–23)
BUN/CREAT SERPL: 17.6 (ref 7–25)
CALCIUM SPEC-SCNC: 9.1 MG/DL (ref 8.6–10.5)
CHLORIDE SERPL-SCNC: 101 MMOL/L (ref 98–107)
CO2 SERPL-SCNC: 27 MMOL/L (ref 22–29)
CREAT SERPL-MCNC: 1.02 MG/DL (ref 0.76–1.27)
EGFRCR SERPLBLD CKD-EPI 2021: 79.1 ML/MIN/1.73
GLUCOSE SERPL-MCNC: 83 MG/DL (ref 65–99)
POTASSIUM SERPL-SCNC: 4.7 MMOL/L (ref 3.5–5.2)
SODIUM SERPL-SCNC: 137 MMOL/L (ref 136–145)

## 2023-11-28 RX ORDER — LOSARTAN POTASSIUM 50 MG/1
50 TABLET ORAL DAILY
Qty: 30 TABLET | Refills: 3 | Status: SHIPPED | OUTPATIENT
Start: 2023-11-28

## 2023-11-28 NOTE — PROGRESS NOTES
Subjective        Chief Complaint  Knee pain    Subjective      History of Present Illness  Ulices Morrissey is a 72 y.o. male who presents today to [facility] for knee pain. He has a past medical history of Arthritis, Hyperlipidemia, low testosterone, BPH, chronic prostatitis, and Rotator cuff injury (1993).    Elevated blood pressure:   Past hx of hypertension:  Recently started on losartan 25 mg daily for recurrent hypertension.  BP in the office today is 160/90.  He reports continued to have episodes that are increased at home, however, is also still some readings in the 120s over 70s.  Denies any associated headache, chest pain, shortness of breath, dizziness.  He has been working on cutting back on sodium intake.    Per our records, he has gained about 11 pounds over the last 5 months.  Recent TSH within normal limits. He reports a previous history of hypertension, however, he had significant weight loss with diet and exercise and was able to come off of medication several years ago.  He has not been running like he was in the past.      Current Outpatient Medications:     anastrozole (ARIMIDEX) 1 MG tablet, Take 1 tablet by mouth 1 (One) Time Per Week. For 12 weeks refills in case another round is needed in the future., Disp: 12 tablet, Rfl: 3    finasteride (PROSCAR) 5 MG tablet, Take 1 tablet by mouth Daily., Disp: 30 tablet, Rfl: 5    losartan (COZAAR) 50 MG tablet, Take 1 tablet by mouth Daily., Disp: 30 tablet, Rfl: 3    methocarbamol (ROBAXIN) 750 MG tablet, Take 1 tablet by mouth 2 (Two) Times a Day., Disp: 60 tablet, Rfl: 5    naproxen (NAPROSYN) 500 MG tablet, Take 1 tablet by mouth 2 (Two) Times a Day With Meals., Disp: 90 tablet, Rfl: 3    sildenafil (REVATIO) 20 MG tablet, As directed, Disp: 60 tablet, Rfl: 10    Syringe, Disposable, 3 ML misc, Use 3 ml syringe with a 25 gauge  5/8 inch needle, Disp: 24 each, Rfl: 6    tamsulosin (Flomax) 0.4 MG capsule 24 hr  "capsule, Take 1 capsule by mouth 2 (two) times a day., Disp: 60 capsule, Rfl: 5    Testosterone Cypionate (Depo-Testosterone) 200 MG/ML injection, He is to use 1/2 cc every Monday and Thursday SQ, Disp: 10 mL, Rfl: 2      Allergies   Allergen Reactions    Sulfa Antibiotics      Objective     Objective   Vital Signs:  /90   Pulse 73   Temp 97.5 °F (36.4 °C) (Temporal)   Ht 180.3 cm (70.98\")   Wt 101 kg (222 lb 5.1 oz)   SpO2 97%   BMI 31.02 kg/m²   Estimated body mass index is 31.02 kg/m² as calculated from the following:    Height as of this encounter: 180.3 cm (70.98\").    Weight as of this encounter: 101 kg (222 lb 5.1 oz).    Past Medical History:   Diagnosis Date    Arthritis     Dyslipidemia     Fatigue     Hyperlipidemia     Lower back pain     Lumbar strain     Myalgia     Rib pain on right side     Rotator cuff injury 1993    bucked of horse     No past surgical history on file.  Social History     Socioeconomic History    Marital status:    Tobacco Use    Smoking status: Never    Smokeless tobacco: Current     Types: Snuff   Vaping Use    Vaping Use: Never used   Substance and Sexual Activity    Alcohol use: No    Drug use: No    Sexual activity: Defer      Physical Exam  Vitals and nursing note reviewed.   Constitutional:       General: He is not in acute distress.     Appearance: He is well-developed. He is not diaphoretic.   HENT:      Head: Normocephalic and atraumatic.   Eyes:      General: No scleral icterus.        Right eye: No discharge.         Left eye: No discharge.      Conjunctiva/sclera: Conjunctivae normal.   Cardiovascular:      Rate and Rhythm: Normal rate and regular rhythm.      Heart sounds: Normal heart sounds. No murmur heard.     No friction rub. No gallop.   Pulmonary:      Effort: Pulmonary effort is normal. No respiratory distress.      Breath sounds: Normal breath sounds. No wheezing or rales.   Chest:      Chest wall: No tenderness.   Musculoskeletal:         " General: No swelling or tenderness. Normal range of motion.      Cervical back: Normal range of motion and neck supple.   Skin:     General: Skin is warm and dry.      Coloration: Skin is not pale.      Findings: No erythema or rash.   Neurological:      Mental Status: He is alert and oriented to person, place, and time.   Psychiatric:         Behavior: Behavior normal.        Result Review :  The following data was reviewed by: MENDEL Dillon on 05/23/2023:  Hemoglobin A1C   Date Value Ref Range Status   10/05/2023 5.50 4.80 - 5.60 % Final     TSH   Date Value Ref Range Status   10/05/2023 3.290 0.270 - 4.200 uIU/mL Final     HDL Cholesterol   Date Value Ref Range Status   10/05/2023 65 (H) 40 - 60 mg/dL Final     LDL Cholesterol    Date Value Ref Range Status   10/05/2023 84 0 - 100 mg/dL Final     Triglycerides   Date Value Ref Range Status   10/05/2023 53 0 - 150 mg/dL Final     Total Cholesterol   Date Value Ref Range Status   10/11/2016 213 (H) 0 - 200 mg/dL Final     Comment:     Cholesterol Reference Ranges:   Desirable       < 200 mg/dL   Borderline    200-239 mg/dL   High Risk       > 239 mg/dL  Triglyceride Reference Ranges:   Normal          < 150 mg/dL   Borderline    150-199 mg/dL   High          200-499 mg/dL   Very High       > 499 mg/dL  HDL Reference Ranges:   Low              < 40 mg/dL   High             > 59 mg/dL  LDL Reference Ranges:   Optimal         < 100 mg/dL   Near Optimal  100-129 mg/dL   Borderline    130-159 mg/dL   High          160-189 mg/dL   Very High       > 189 mg/dL           Assessment / Plan         Assessment   Diagnoses and all orders for this visit:  1. Primary hypertension  BMP repeated today and shows stable/slightly improved renal function and stable potassium at 4.7.  Will increase losartan to 50 mg daily.  Monitor BP at home and call if BP continues to run >140/90.  Avoid sodium in the diet.   Encouraged on avoiding heavy intake of foods high in  potassium.  Follow-up in 3 weeks or sooner if needed for close monitoring.       New Medications Ordered This Visit   Medications    losartan (COZAAR) 50 MG tablet     Sig: Take 1 tablet by mouth Daily.     Dispense:  30 tablet     Refill:  3     Health Maintenance  Referred to Dr. Gonsales for updated colorectal cancer screening.  Consider Tdap if none completed in the last 10 years.  Consider Shingrix vaccine series.  Pneumococcal and influenza vaccines updated 2023.    Follow Up   Return in about 3 weeks (around 12/18/2023).    Patient was given instructions and counseling regarding his condition or for health maintenance advice. Please see specific information pulled into the AVS if appropriate.       This document has been electronically signed by MENDEL Dillon   November 28, 2023 07:59 EST    Dictated Utilizing Dragon Dictation: Part of this note may be an electronic transcription/translation of spoken language to printed text using the Dragon Dictation System.

## 2023-12-18 ENCOUNTER — OFFICE VISIT (OUTPATIENT)
Dept: FAMILY MEDICINE CLINIC | Facility: CLINIC | Age: 73
End: 2023-12-18
Payer: COMMERCIAL

## 2023-12-18 VITALS
HEIGHT: 71 IN | HEART RATE: 72 BPM | DIASTOLIC BLOOD PRESSURE: 90 MMHG | SYSTOLIC BLOOD PRESSURE: 160 MMHG | OXYGEN SATURATION: 96 % | TEMPERATURE: 98 F | WEIGHT: 217.2 LBS | BODY MASS INDEX: 30.41 KG/M2

## 2023-12-18 DIAGNOSIS — I10 PRIMARY HYPERTENSION: Primary | ICD-10-CM

## 2023-12-18 RX ORDER — CARVEDILOL 3.12 MG/1
3.12 TABLET ORAL 2 TIMES DAILY WITH MEALS
Qty: 60 TABLET | Refills: 2 | Status: SHIPPED | OUTPATIENT
Start: 2023-12-18

## 2023-12-18 NOTE — PROGRESS NOTES
Subjective      Chief Complaint  Chief Complaint   Patient presents with    Follow-up    Hypertension     Subjective      History of Present Illness  Ulices Morrissey is a 72 y.o. male who presents today to [facility] for hypertension. He has a past medical history of Arthritis, hypertension, Hyperlipidemia, low testosterone, BPH, chronic prostatitis, and Rotator cuff injury (1993).    Elevated blood pressure:   Past hx of hypertension:  Recently started on losartan and advanced to 50 mg daily.  BP in the office today is 160/90 and about the same on repeat. He has noted some improved at home, occasional readings into the 110s-120s, however, frequently staying 140s-150s/80s/90s. He has been working on diet/exercise and has lost 5lbs over the last 3 weeks.     Denies any associated headache, chest pain, shortness of breath, dizziness.      He reports a previous history of hypertension, however, he had significant weight loss with diet and exercise and was able to come off of medication several years ago.  He has not been running like he was in the past.      Current Outpatient Medications:     anastrozole (ARIMIDEX) 1 MG tablet, Take 1 tablet by mouth 1 (One) Time Per Week. For 12 weeks refills in case another round is needed in the future., Disp: 12 tablet, Rfl: 3    losartan (COZAAR) 50 MG tablet, Take 1 tablet by mouth Daily., Disp: 30 tablet, Rfl: 3    methocarbamol (ROBAXIN) 750 MG tablet, Take 1 tablet by mouth 2 (Two) Times a Day., Disp: 60 tablet, Rfl: 5    naproxen (NAPROSYN) 500 MG tablet, Take 1 tablet by mouth 2 (Two) Times a Day With Meals., Disp: 90 tablet, Rfl: 3    sildenafil (REVATIO) 20 MG tablet, As directed, Disp: 60 tablet, Rfl: 10    Syringe, Disposable, 3 ML misc, Use 3 ml syringe with a 25 gauge  5/8 inch needle, Disp: 24 each, Rfl: 6    Testosterone Cypionate (Depo-Testosterone) 200 MG/ML injection, He is to use 1/2 cc every Monday and Thursday SQ, Disp: 10 mL,  "Rfl: 2    carvedilol (Coreg) 3.125 MG tablet, Take 1 tablet by mouth 2 (Two) Times a Day With Meals., Disp: 60 tablet, Rfl: 2      Allergies   Allergen Reactions    Sulfa Antibiotics      Objective     Objective   Vital Signs:  /90   Pulse 72   Temp 98 °F (36.7 °C) (Temporal)   Ht 180.3 cm (70.98\")   Wt 98.5 kg (217 lb 3.2 oz)   SpO2 96%   BMI 30.31 kg/m²   Estimated body mass index is 30.31 kg/m² as calculated from the following:    Height as of this encounter: 180.3 cm (70.98\").    Weight as of this encounter: 98.5 kg (217 lb 3.2 oz).    Past Medical History:   Diagnosis Date    Arthritis     Dyslipidemia     Fatigue     Hyperlipidemia     Lower back pain     Lumbar strain     Myalgia     Rib pain on right side     Rotator cuff injury 1993    bucked of horse     No past surgical history on file.    Social History     Socioeconomic History    Marital status:    Tobacco Use    Smoking status: Never    Smokeless tobacco: Current     Types: Snuff   Vaping Use    Vaping Use: Never used   Substance and Sexual Activity    Alcohol use: No    Drug use: No    Sexual activity: Defer      Physical Exam  Vitals and nursing note reviewed.   Constitutional:       General: He is not in acute distress.     Appearance: He is well-developed. He is not diaphoretic.   HENT:      Head: Normocephalic and atraumatic.   Eyes:      General: No scleral icterus.        Right eye: No discharge.         Left eye: No discharge.      Conjunctiva/sclera: Conjunctivae normal.   Cardiovascular:      Rate and Rhythm: Normal rate and regular rhythm.      Heart sounds: Normal heart sounds. No murmur heard.     No friction rub. No gallop.   Pulmonary:      Effort: Pulmonary effort is normal. No respiratory distress.      Breath sounds: Normal breath sounds. No wheezing or rales.   Chest:      Chest wall: No tenderness.   Musculoskeletal:         General: No swelling or tenderness. Normal range of motion.      Cervical back: Normal " range of motion and neck supple.   Skin:     General: Skin is warm and dry.      Coloration: Skin is not pale.      Findings: No erythema or rash.   Neurological:      Mental Status: He is alert and oriented to person, place, and time.   Psychiatric:         Behavior: Behavior normal.        Result Review :  The following data was reviewed by: MENDEL Dillon on 05/23/2023:  Hemoglobin A1C   Date Value Ref Range Status   10/05/2023 5.50 4.80 - 5.60 % Final     TSH   Date Value Ref Range Status   10/05/2023 3.290 0.270 - 4.200 uIU/mL Final     HDL Cholesterol   Date Value Ref Range Status   10/05/2023 65 (H) 40 - 60 mg/dL Final     LDL Cholesterol    Date Value Ref Range Status   10/05/2023 84 0 - 100 mg/dL Final     Triglycerides   Date Value Ref Range Status   10/05/2023 53 0 - 150 mg/dL Final     Total Cholesterol   Date Value Ref Range Status   10/11/2016 213 (H) 0 - 200 mg/dL Final     Comment:     Cholesterol Reference Ranges:   Desirable       < 200 mg/dL   Borderline    200-239 mg/dL   High Risk       > 239 mg/dL  Triglyceride Reference Ranges:   Normal          < 150 mg/dL   Borderline    150-199 mg/dL   High          200-499 mg/dL   Very High       > 499 mg/dL  HDL Reference Ranges:   Low              < 40 mg/dL   High             > 59 mg/dL  LDL Reference Ranges:   Optimal         < 100 mg/dL   Near Optimal  100-129 mg/dL   Borderline    130-159 mg/dL   High          160-189 mg/dL   Very High       > 189 mg/dL       Lab Results   Component Value Date    BUN 18 11/27/2023     Lab Results   Component Value Date    CREATININE 1.02 11/27/2023         Assessment / Plan         Assessment   Diagnoses and all orders for this visit:  1. Primary hypertension  Some improvement noted in home readings on losartan 50mg daily. Discussed options of additional medical therapy. He reports he doesn't generally drink a lot of fluid and does walk/run on a regular basis. Will avoid diuretics for now.   Will start  carvedilol at 3.125mg BID for now.   Monitor BP at home and call if BP continues to run >140/90.  Avoid sodium in the diet.   He plans to come by for a UA tomorrow. Will check UA, micro, urine microalbumin and urine protein/creatinine ratio.   Follow up 4 weeks or sooner if needed.        New Medications Ordered This Visit   Medications    carvedilol (Coreg) 3.125 MG tablet     Sig: Take 1 tablet by mouth 2 (Two) Times a Day With Meals.     Dispense:  60 tablet     Refill:  2     Health Maintenance  Referred to Dr. Gonsales for updated colorectal cancer screening.  Consider Tdap if none completed in the last 10 years.  Consider Shingrix vaccine series.  Pneumococcal and influenza vaccines updated 2023.    Follow Up   Return in about 1 month (around 1/18/2024).    Patient was given instructions and counseling regarding his condition or for health maintenance advice. Please see specific information pulled into the AVS if appropriate.       This document has been electronically signed by MENDEL Dillon   December 18, 2023 20:43 EST    Dictated Utilizing Dragon Dictation: Part of this note may be an electronic transcription/translation of spoken language to printed text using the Dragon Dictation System.

## 2023-12-19 ENCOUNTER — LAB (OUTPATIENT)
Dept: FAMILY MEDICINE CLINIC | Facility: CLINIC | Age: 73
End: 2023-12-19
Payer: COMMERCIAL

## 2023-12-19 DIAGNOSIS — I10 PRIMARY HYPERTENSION: ICD-10-CM

## 2023-12-19 PROCEDURE — 82570 ASSAY OF URINE CREATININE: CPT | Performed by: PHYSICIAN ASSISTANT

## 2023-12-19 PROCEDURE — 84156 ASSAY OF PROTEIN URINE: CPT | Performed by: PHYSICIAN ASSISTANT

## 2023-12-19 PROCEDURE — 81001 URINALYSIS AUTO W/SCOPE: CPT | Performed by: PHYSICIAN ASSISTANT

## 2023-12-19 PROCEDURE — 82043 UR ALBUMIN QUANTITATIVE: CPT | Performed by: PHYSICIAN ASSISTANT

## 2023-12-20 LAB
ALBUMIN UR-MCNC: <1.2 MG/DL
BACTERIA UR QL AUTO: ABNORMAL /HPF
BILIRUB UR QL STRIP: NEGATIVE
CLARITY UR: CLEAR
COLOR UR: ABNORMAL
CREAT UR-MCNC: 169.9 MG/DL
GLUCOSE UR STRIP-MCNC: NEGATIVE MG/DL
HGB UR QL STRIP.AUTO: NEGATIVE
HYALINE CASTS UR QL AUTO: ABNORMAL /LPF
KETONES UR QL STRIP: ABNORMAL
LEUKOCYTE ESTERASE UR QL STRIP.AUTO: NEGATIVE
MUCOUS THREADS URNS QL MICRO: ABNORMAL /HPF
NITRITE UR QL STRIP: NEGATIVE
PH UR STRIP.AUTO: 6.5 [PH] (ref 5–8)
PROT ?TM UR-MCNC: 10.5 MG/DL
PROT UR QL STRIP: NEGATIVE
PROT/CREAT UR: 61.8 MG/G CREA (ref 0–200)
RBC # UR STRIP: ABNORMAL /HPF
REF LAB TEST METHOD: ABNORMAL
SP GR UR STRIP: 1.02 (ref 1–1.03)
SQUAMOUS #/AREA URNS HPF: ABNORMAL /HPF
UROBILINOGEN UR QL STRIP: ABNORMAL
WBC # UR STRIP: ABNORMAL /HPF

## 2024-01-18 ENCOUNTER — OFFICE VISIT (OUTPATIENT)
Dept: FAMILY MEDICINE CLINIC | Facility: CLINIC | Age: 74
End: 2024-01-18
Payer: COMMERCIAL

## 2024-01-18 VITALS
BODY MASS INDEX: 30.38 KG/M2 | SYSTOLIC BLOOD PRESSURE: 150 MMHG | HEART RATE: 71 BPM | WEIGHT: 217 LBS | TEMPERATURE: 98 F | HEIGHT: 71 IN | OXYGEN SATURATION: 97 % | DIASTOLIC BLOOD PRESSURE: 90 MMHG

## 2024-01-18 DIAGNOSIS — I10 PRIMARY HYPERTENSION: ICD-10-CM

## 2024-01-18 RX ORDER — CARVEDILOL 6.25 MG/1
6.25 TABLET ORAL 2 TIMES DAILY WITH MEALS
Qty: 60 TABLET | Refills: 1 | Status: SHIPPED | OUTPATIENT
Start: 2024-01-18

## 2024-01-18 NOTE — PROGRESS NOTES
Subjective      Chief Complaint  Chief Complaint   Patient presents with    Follow-up     Subjective      History of Present Illness  Ulices Morrissey is a 73 y.o. male who presents today to [facility] for hypertension. He has a past medical history of Arthritis, hypertension, Hyperlipidemia, low testosterone, BPH, chronic prostatitis, and Rotator cuff injury (1993).    Elevated blood pressure:   Past hx of hypertension:  Recently started on losartan and advanced to 50 mg daily.  Due to persistently elevated levels, he was recently started on carvedilol 3.125 mg twice daily in addition to the losartan 50 mg which he takes at night.  BP currently running in the 140s to 160s mostly, however, has had 2 readings into the 170s and 180s at home.  He has no associated symptoms.  No associated headache, flushing, nausea, vomiting, chest pain, shortness of breath, or dizziness.  He does have some chronic pain in his joints and feels this may be contributing.  He also intermittently takes naproxen. He has worked on decreasing sodium intake. Denies any associated headache, chest pain, shortness of breath, dizziness.  Recent microscopic UA showed no blood or protein.  Recent protein creatinine ratio was within normal limits as well as urine microalbumin.      Current Outpatient Medications:     anastrozole (ARIMIDEX) 1 MG tablet, Take 1 tablet by mouth 1 (One) Time Per Week. For 12 weeks refills in case another round is needed in the future., Disp: 12 tablet, Rfl: 3    carvedilol (Coreg) 6.25 MG tablet, Take 1 tablet by mouth 2 (Two) Times a Day With Meals., Disp: 60 tablet, Rfl: 1    losartan (COZAAR) 50 MG tablet, Take 1 tablet by mouth Daily., Disp: 30 tablet, Rfl: 3    methocarbamol (ROBAXIN) 750 MG tablet, Take 1 tablet by mouth 2 (Two) Times a Day., Disp: 60 tablet, Rfl: 5    naproxen (NAPROSYN) 500 MG tablet, Take 1 tablet by mouth 2 (Two) Times a Day With Meals., Disp: 90 tablet, Rfl:  "3    sildenafil (REVATIO) 20 MG tablet, As directed, Disp: 60 tablet, Rfl: 10    Syringe, Disposable, 3 ML misc, Use 3 ml syringe with a 25 gauge  5/8 inch needle, Disp: 24 each, Rfl: 6    Testosterone Cypionate (Depo-Testosterone) 200 MG/ML injection, He is to use 1/2 cc every Monday and Thursday SQ, Disp: 10 mL, Rfl: 2      Allergies   Allergen Reactions    Sulfa Antibiotics      Objective     Objective   Vital Signs:  /90   Pulse 71   Temp 98 °F (36.7 °C) (Temporal)   Ht 180.3 cm (70.98\")   Wt 98.4 kg (217 lb)   SpO2 97%   BMI 30.28 kg/m²   Estimated body mass index is 30.28 kg/m² as calculated from the following:    Height as of this encounter: 180.3 cm (70.98\").    Weight as of this encounter: 98.4 kg (217 lb).    Past Medical History:   Diagnosis Date    Arthritis     Dyslipidemia     Fatigue     Hyperlipidemia     Lower back pain     Lumbar strain     Myalgia     Rib pain on right side     Rotator cuff injury 1993    bucked of horse     No past surgical history on file.    Social History     Socioeconomic History    Marital status:    Tobacco Use    Smoking status: Never    Smokeless tobacco: Current     Types: Snuff   Vaping Use    Vaping Use: Never used   Substance and Sexual Activity    Alcohol use: No    Drug use: No    Sexual activity: Defer      Physical Exam  Vitals and nursing note reviewed.   Constitutional:       General: He is not in acute distress.     Appearance: He is well-developed. He is not diaphoretic.   HENT:      Head: Normocephalic and atraumatic.   Eyes:      General: No scleral icterus.        Right eye: No discharge.         Left eye: No discharge.      Conjunctiva/sclera: Conjunctivae normal.   Cardiovascular:      Rate and Rhythm: Normal rate and regular rhythm.      Heart sounds: Normal heart sounds. No murmur heard.     No friction rub. No gallop.   Pulmonary:      Effort: Pulmonary effort is normal. No respiratory distress.      Breath sounds: Normal breath " sounds. No wheezing or rales.   Chest:      Chest wall: No tenderness.   Musculoskeletal:         General: No swelling or tenderness. Normal range of motion.      Cervical back: Normal range of motion and neck supple.   Skin:     General: Skin is warm and dry.      Coloration: Skin is not pale.      Findings: No erythema or rash.   Neurological:      Mental Status: He is alert and oriented to person, place, and time.   Psychiatric:         Behavior: Behavior normal.        Result Review :  The following data was reviewed by: MENDEL Dillon on 05/23/2023:  Hemoglobin A1C   Date Value Ref Range Status   10/05/2023 5.50 4.80 - 5.60 % Final     TSH   Date Value Ref Range Status   10/05/2023 3.290 0.270 - 4.200 uIU/mL Final     HDL Cholesterol   Date Value Ref Range Status   10/05/2023 65 (H) 40 - 60 mg/dL Final     LDL Cholesterol    Date Value Ref Range Status   10/05/2023 84 0 - 100 mg/dL Final     Triglycerides   Date Value Ref Range Status   10/05/2023 53 0 - 150 mg/dL Final     Total Cholesterol   Date Value Ref Range Status   10/11/2016 213 (H) 0 - 200 mg/dL Final     Comment:     Cholesterol Reference Ranges:   Desirable       < 200 mg/dL   Borderline    200-239 mg/dL   High Risk       > 239 mg/dL  Triglyceride Reference Ranges:   Normal          < 150 mg/dL   Borderline    150-199 mg/dL   High          200-499 mg/dL   Very High       > 499 mg/dL  HDL Reference Ranges:   Low              < 40 mg/dL   High             > 59 mg/dL  LDL Reference Ranges:   Optimal         < 100 mg/dL   Near Optimal  100-129 mg/dL   Borderline    130-159 mg/dL   High          160-189 mg/dL   Very High       > 189 mg/dL       Lab Results   Component Value Date    BUN 18 11/27/2023     Lab Results   Component Value Date    CREATININE 1.02 11/27/2023       ECG 12 Lead    Date/Time: 1/18/2024 2:25 PM  Performed by: Cira Aaron PA    Authorized by: Cira Aaron PA  Previous ECG: no previous ECG  available  Rhythm: sinus rhythm  Rate: normal  BPM: 65  QRS axis: normal    Clinical impression: non-specific ECG  Comments: Sinus rhythm at 65 bpm.  Borderline voltage criteria for LVH, consider normal variant.  QTc 402 ms.         Assessment / Plan         Assessment   Diagnoses and all orders for this visit:    1. Primary hypertension  Continue losartan 50mg nightly.   Increase carvedilol to 6.25mg BID.   Limit use of NSAIDs is much as possible.  I have asked him to monitor his blood pressure if he does take the naproxen to see if this is correlating with his increased levels intermittently.  He is to contact the office in 2 weeks with blood pressure readings and we will consider increasing his carvedilol from there.  Monitor BP at home and call if BP continues to run >140/90.  Avoid sodium in the diet.   Follow up 4 weeks or sooner if needed.        New Medications Ordered This Visit   Medications    carvedilol (Coreg) 6.25 MG tablet     Sig: Take 1 tablet by mouth 2 (Two) Times a Day With Meals.     Dispense:  60 tablet     Refill:  1     Cancel RX for the 3.125mg BID.     Health Maintenance  Referred to Dr. Gonsales for updated colorectal cancer screening, planning for this in July 2024.  Consider Tdap if none completed in the last 10 years.  Consider Shingrix vaccine series.  Pneumococcal and influenza vaccines updated 2023.    Follow Up   Return in about 1 month (around 2/18/2024) for Recheck BP. .    Patient was given instructions and counseling regarding his condition or for health maintenance advice. Please see specific information pulled into the AVS if appropriate.       This document has been electronically signed by MENDEL Dillon   January 18, 2024 14:22 EST    Dictated Utilizing Dragon Dictation: Part of this note may be an electronic transcription/translation of spoken language to printed text using the Dragon Dictation System.

## 2024-02-20 ENCOUNTER — OFFICE VISIT (OUTPATIENT)
Dept: FAMILY MEDICINE CLINIC | Facility: CLINIC | Age: 74
End: 2024-02-20
Payer: COMMERCIAL

## 2024-02-20 VITALS
DIASTOLIC BLOOD PRESSURE: 90 MMHG | TEMPERATURE: 97.5 F | SYSTOLIC BLOOD PRESSURE: 140 MMHG | HEART RATE: 82 BPM | WEIGHT: 219.4 LBS | OXYGEN SATURATION: 96 % | BODY MASS INDEX: 30.72 KG/M2 | HEIGHT: 71 IN

## 2024-02-20 DIAGNOSIS — I10 PRIMARY HYPERTENSION: ICD-10-CM

## 2024-02-20 PROCEDURE — 80048 BASIC METABOLIC PNL TOTAL CA: CPT | Performed by: PHYSICIAN ASSISTANT

## 2024-02-20 RX ORDER — LOSARTAN POTASSIUM 100 MG/1
100 TABLET ORAL DAILY
Qty: 30 TABLET | Refills: 3 | Status: SHIPPED | OUTPATIENT
Start: 2024-02-20

## 2024-02-20 RX ORDER — AMLODIPINE BESYLATE 5 MG/1
5 TABLET ORAL DAILY
Qty: 30 TABLET | Refills: 3 | Status: SHIPPED | OUTPATIENT
Start: 2024-02-20 | End: 2024-03-21

## 2024-02-20 NOTE — PROGRESS NOTES
Subjective      Chief Complaint  Chief Complaint   Patient presents with    Follow-up    Hypertension     Subjective      History of Present Illness  Ulices Morrissey is a 73 y.o. male who presents today to [facility] for hypertension. He has a past medical history of Arthritis, hypertension, Hyperlipidemia, low testosterone, BPH, chronic prostatitis, and Rotator cuff injury (1993).    Elevated blood pressure:   Past hx of hypertension:  Recently started on losartan and advanced to 50 mg daily.  Due to persistently elevated levels, he was recently started on carvedilol, however, today reports that it was causing some sexual side effects and he discontinued it.  He did increase his losartan to 75 mg, then to 100 mg at night and does report improved blood pressures overall.  Of the morning he is running 150s over 90s.  After exercise, 115 over 70s.  And at nighttime, high 130s over 80s.  He is overall feeling better.     Recent microscopic UA showed no blood or protein.  Recent protein creatinine ratio was within normal limits as well as urine microalbumin.  Recent in office EKG showed no  findings concerning for ischemia.      Current Outpatient Medications:     anastrozole (ARIMIDEX) 1 MG tablet, Take 1 tablet by mouth 1 (One) Time Per Week. For 12 weeks refills in case another round is needed in the future., Disp: 12 tablet, Rfl: 3    losartan (COZAAR) 100 MG tablet, Take 1 tablet by mouth Daily., Disp: 30 tablet, Rfl: 3    methocarbamol (ROBAXIN) 750 MG tablet, Take 1 tablet by mouth 2 (Two) Times a Day., Disp: 60 tablet, Rfl: 5    naproxen (NAPROSYN) 500 MG tablet, Take 1 tablet by mouth 2 (Two) Times a Day With Meals., Disp: 90 tablet, Rfl: 3    sildenafil (REVATIO) 20 MG tablet, As directed, Disp: 60 tablet, Rfl: 10    Syringe, Disposable, 3 ML misc, Use 3 ml syringe with a 25 gauge  5/8 inch needle, Disp: 24 each, Rfl: 6    Testosterone Cypionate (Depo-Testosterone) 200 MG/ML  "injection, He is to use 1/2 cc every Monday and Thursday SQ, Disp: 10 mL, Rfl: 2    amLODIPine (NORVASC) 5 MG tablet, Take 1 tablet by mouth Daily for 30 days., Disp: 30 tablet, Rfl: 3      Allergies   Allergen Reactions    Sulfa Antibiotics      Objective     Objective   Vital Signs:  /90   Pulse 82   Temp 97.5 °F (36.4 °C) (Temporal)   Ht 180.3 cm (70.98\")   Wt 99.5 kg (219 lb 6.4 oz)   SpO2 96%   BMI 30.61 kg/m²   Estimated body mass index is 30.61 kg/m² as calculated from the following:    Height as of this encounter: 180.3 cm (70.98\").    Weight as of this encounter: 99.5 kg (219 lb 6.4 oz).    Past Medical History:   Diagnosis Date    Arthritis     Dyslipidemia     Fatigue     Hyperlipidemia     Lower back pain     Lumbar strain     Myalgia     Rib pain on right side     Rotator cuff injury 1993    bucked of horse     No past surgical history on file.    Social History     Socioeconomic History    Marital status:    Tobacco Use    Smoking status: Never    Smokeless tobacco: Current     Types: Snuff   Vaping Use    Vaping Use: Never used   Substance and Sexual Activity    Alcohol use: No    Drug use: No    Sexual activity: Defer      Physical Exam  Vitals and nursing note reviewed.   Constitutional:       General: He is not in acute distress.     Appearance: He is well-developed. He is not diaphoretic.   HENT:      Head: Normocephalic and atraumatic.   Eyes:      General: No scleral icterus.        Right eye: No discharge.         Left eye: No discharge.      Conjunctiva/sclera: Conjunctivae normal.   Cardiovascular:      Rate and Rhythm: Normal rate and regular rhythm.      Heart sounds: Normal heart sounds. No murmur heard.     No friction rub. No gallop.   Pulmonary:      Effort: Pulmonary effort is normal. No respiratory distress.      Breath sounds: Normal breath sounds. No wheezing or rales.   Chest:      Chest wall: No tenderness.   Musculoskeletal:         General: No swelling or " tenderness. Normal range of motion.      Cervical back: Normal range of motion and neck supple.   Skin:     General: Skin is warm and dry.      Coloration: Skin is not pale.      Findings: No erythema or rash.   Neurological:      Mental Status: He is alert and oriented to person, place, and time.   Psychiatric:         Behavior: Behavior normal.        Result Review :  The following data was reviewed by: MENDEL Dillon on 05/23/2023:  Hemoglobin A1C   Date Value Ref Range Status   10/05/2023 5.50 4.80 - 5.60 % Final     TSH   Date Value Ref Range Status   10/05/2023 3.290 0.270 - 4.200 uIU/mL Final     HDL Cholesterol   Date Value Ref Range Status   10/05/2023 65 (H) 40 - 60 mg/dL Final     LDL Cholesterol    Date Value Ref Range Status   10/05/2023 84 0 - 100 mg/dL Final     Triglycerides   Date Value Ref Range Status   10/05/2023 53 0 - 150 mg/dL Final     Total Cholesterol   Date Value Ref Range Status   10/11/2016 213 (H) 0 - 200 mg/dL Final     Comment:     Cholesterol Reference Ranges:   Desirable       < 200 mg/dL   Borderline    200-239 mg/dL   High Risk       > 239 mg/dL  Triglyceride Reference Ranges:   Normal          < 150 mg/dL   Borderline    150-199 mg/dL   High          200-499 mg/dL   Very High       > 499 mg/dL  HDL Reference Ranges:   Low              < 40 mg/dL   High             > 59 mg/dL  LDL Reference Ranges:   Optimal         < 100 mg/dL   Near Optimal  100-129 mg/dL   Borderline    130-159 mg/dL   High          160-189 mg/dL   Very High       > 189 mg/dL       Lab Results   Component Value Date    BUN 17 02/20/2024     Lab Results   Component Value Date    CREATININE 1.08 02/20/2024         Assessment / Plan         Assessment   Diagnoses and all orders for this visit:    1. Primary hypertension  Continue losartan 100mg nightly.   BMP obtained today and does show stable BUN, creatinine.  Potassium is 4.9.  Discussed risk of elevated potassium with losartan and to avoid foods high in  potassium such as citrus fruits, nuts, fruit juices and excess.  Will add amlodipine 5 mg nightly to help reduce his a.m. high BPs.  Limit use of NSAIDs is much as possible.   Monitor BP at home and call if BP continues to run >140/90.  Avoid sodium in the diet.   Follow up 8 weeks or sooner if needed.        New Medications Ordered This Visit   Medications    losartan (COZAAR) 100 MG tablet     Sig: Take 1 tablet by mouth Daily.     Dispense:  30 tablet     Refill:  3    amLODIPine (NORVASC) 5 MG tablet     Sig: Take 1 tablet by mouth Daily for 30 days.     Dispense:  30 tablet     Refill:  3     Cancel Carvedilol.     Health Maintenance  Referred to Dr. Gonsales for updated colorectal cancer screening, planning for this in July 2024.  Consider Tdap if none completed in the last 10 years.  Consider Shingrix vaccine series.  Pneumococcal and influenza vaccines updated 2023.    Follow Up   Return in about 2 months (around 4/20/2024) for Recheck HTN.    Patient was given instructions and counseling regarding his condition or for health maintenance advice. Please see specific information pulled into the AVS if appropriate.       This document has been electronically signed by MENDEL Dillon   February 22, 2024 07:48 EST    Dictated Utilizing Dragon Dictation: Part of this note may be an electronic transcription/translation of spoken language to printed text using the Dragon Dictation System.

## 2024-02-21 LAB
ANION GAP SERPL CALCULATED.3IONS-SCNC: 9 MMOL/L (ref 5–15)
BUN SERPL-MCNC: 17 MG/DL (ref 8–23)
BUN/CREAT SERPL: 15.7 (ref 7–25)
CALCIUM SPEC-SCNC: 9.2 MG/DL (ref 8.6–10.5)
CHLORIDE SERPL-SCNC: 104 MMOL/L (ref 98–107)
CO2 SERPL-SCNC: 26 MMOL/L (ref 22–29)
CREAT SERPL-MCNC: 1.08 MG/DL (ref 0.76–1.27)
EGFRCR SERPLBLD CKD-EPI 2021: 73.4 ML/MIN/1.73
GLUCOSE SERPL-MCNC: 82 MG/DL (ref 65–99)
POTASSIUM SERPL-SCNC: 4.9 MMOL/L (ref 3.5–5.2)
SODIUM SERPL-SCNC: 139 MMOL/L (ref 136–145)

## 2024-04-04 ENCOUNTER — OFFICE VISIT (OUTPATIENT)
Dept: UROLOGY | Facility: CLINIC | Age: 74
End: 2024-04-04
Payer: COMMERCIAL

## 2024-04-04 VITALS
HEART RATE: 69 BPM | SYSTOLIC BLOOD PRESSURE: 151 MMHG | DIASTOLIC BLOOD PRESSURE: 89 MMHG | BODY MASS INDEX: 30.52 KG/M2 | HEIGHT: 71 IN | WEIGHT: 218 LBS

## 2024-04-04 DIAGNOSIS — N52.02 CORPORO-VENOUS OCCLUSIVE ERECTILE DYSFUNCTION: ICD-10-CM

## 2024-04-04 DIAGNOSIS — R79.89 LOW TESTOSTERONE: Primary | ICD-10-CM

## 2024-04-04 DIAGNOSIS — E29.1 HYPOGONADISM IN MALE: ICD-10-CM

## 2024-04-04 DIAGNOSIS — N42.9 DISORDER OF PROSTATE: ICD-10-CM

## 2024-04-04 DIAGNOSIS — N52.9 ERECTILE DYSFUNCTION, UNSPECIFIED ERECTILE DYSFUNCTION TYPE: ICD-10-CM

## 2024-04-04 PROCEDURE — 85027 COMPLETE CBC AUTOMATED: CPT | Performed by: UROLOGY

## 2024-04-04 PROCEDURE — 36415 COLL VENOUS BLD VENIPUNCTURE: CPT | Performed by: UROLOGY

## 2024-04-04 PROCEDURE — 99214 OFFICE O/P EST MOD 30 MIN: CPT | Performed by: UROLOGY

## 2024-04-04 PROCEDURE — 84403 ASSAY OF TOTAL TESTOSTERONE: CPT | Performed by: UROLOGY

## 2024-04-04 PROCEDURE — 84153 ASSAY OF PSA TOTAL: CPT | Performed by: UROLOGY

## 2024-04-04 PROCEDURE — 82670 ASSAY OF TOTAL ESTRADIOL: CPT | Performed by: UROLOGY

## 2024-04-04 RX ORDER — TESTOSTERONE CYPIONATE 200 MG/ML
INJECTION, SOLUTION INTRAMUSCULAR
Qty: 10 ML | Refills: 2 | Status: SHIPPED | OUTPATIENT
Start: 2024-04-04

## 2024-04-04 RX ORDER — SILDENAFIL CITRATE 20 MG/1
TABLET ORAL
Qty: 60 TABLET | Refills: 10 | Status: SHIPPED | OUTPATIENT
Start: 2024-04-04

## 2024-04-05 LAB
DEPRECATED RDW RBC AUTO: 39.2 FL (ref 37–54)
ERYTHROCYTE [DISTWIDTH] IN BLOOD BY AUTOMATED COUNT: 12.1 % (ref 12.3–15.4)
ESTRADIOL SERPL HS-MCNC: 53.1 PG/ML
HCT VFR BLD AUTO: 44.7 % (ref 37.5–51)
HGB BLD-MCNC: 14.9 G/DL (ref 13–17.7)
MCH RBC QN AUTO: 29.8 PG (ref 26.6–33)
MCHC RBC AUTO-ENTMCNC: 33.3 G/DL (ref 31.5–35.7)
MCV RBC AUTO: 89.4 FL (ref 79–97)
PLATELET # BLD AUTO: 245 10*3/MM3 (ref 140–450)
PMV BLD AUTO: 11.4 FL (ref 6–12)
PSA SERPL-MCNC: 0.86 NG/ML (ref 0–4)
RBC # BLD AUTO: 5 10*6/MM3 (ref 4.14–5.8)
TESTOST SERPL-MCNC: >1500 NG/DL (ref 193–740)
WBC NRBC COR # BLD AUTO: 6.62 10*3/MM3 (ref 3.4–10.8)

## 2024-04-08 ENCOUNTER — TELEPHONE (OUTPATIENT)
Dept: UROLOGY | Facility: CLINIC | Age: 74
End: 2024-04-08
Payer: COMMERCIAL

## 2024-04-08 DIAGNOSIS — E28.0 ESTRADIOL EXCESS: Primary | ICD-10-CM

## 2024-04-08 RX ORDER — ANASTROZOLE 1 MG/1
1 TABLET ORAL WEEKLY
Qty: 12 TABLET | Refills: 0 | Status: SHIPPED | OUTPATIENT
Start: 2024-04-08

## 2024-04-08 NOTE — TELEPHONE ENCOUNTER
----- Message from Pablo Marx MD sent at 4/8/2024  6:35 AM EDT -----  arimidex  ----- Message -----  From: Lab, Background User  Sent: 4/5/2024   2:12 AM EDT  To: Pablo Marx MDI called the pt and left  let him know that his labs overall were great. However, we did notice that his estradiol level was elevated and what we recommend for this is a round of armidex it is a 1 mg tablet you take weekly for 12 weeks and then when retested see if you need another round.

## 2024-04-22 ENCOUNTER — OFFICE VISIT (OUTPATIENT)
Dept: FAMILY MEDICINE CLINIC | Facility: CLINIC | Age: 74
End: 2024-04-22
Payer: COMMERCIAL

## 2024-04-22 VITALS
TEMPERATURE: 99 F | HEART RATE: 85 BPM | BODY MASS INDEX: 30.38 KG/M2 | OXYGEN SATURATION: 95 % | WEIGHT: 217 LBS | DIASTOLIC BLOOD PRESSURE: 90 MMHG | HEIGHT: 71 IN | SYSTOLIC BLOOD PRESSURE: 160 MMHG

## 2024-04-22 DIAGNOSIS — I10 PRIMARY HYPERTENSION: Primary | ICD-10-CM

## 2024-04-22 DIAGNOSIS — M75.22 BICEPS TENDONITIS ON LEFT: ICD-10-CM

## 2024-04-22 PROCEDURE — 99213 OFFICE O/P EST LOW 20 MIN: CPT | Performed by: PHYSICIAN ASSISTANT

## 2024-04-22 RX ORDER — LOSARTAN POTASSIUM 50 MG/1
50 TABLET ORAL NIGHTLY
Start: 2024-04-22

## 2024-04-22 NOTE — PROGRESS NOTES
Subjective      Chief Complaint  Chief Complaint   Patient presents with    Hypertension     Subjective      Ulices Morrissey is a 73 y.o. male who presents today to [facility] for hypertension. He has a past medical history of Arthritis, hypertension, Hyperlipidemia, low testosterone, BPH, chronic prostatitis, and Rotator cuff injury ().    Elevated blood pressure:   Past hx of hypertension:  BP in the office today is 160/90.  He was previously treated on losartan which was increased to 100 mg daily.  At his last visit, he was started on amlodipine 5 mg nightly given morning BP elevations.  He reports that while on that regimen, his BP would drop to the 80s over 60s with exercise.  He does report being symptomatic with dizziness at that time.  He then stopped both the losartan and amlodipine altogether.  BP runs in the 130s to 150s systolic over 70s to 90s diastolic.    Recent microscopic UA showed no blood or protein.  Recent protein creatinine ratio was within normal limits as well as urine microalbumin.  Recent in office EKG showed no  findings concerning for ischemia.    Previously tried: Carvedilol, d/c'd 2/2 sexual dysfunction.     Left Shoulder pain:   He reports chronic pain in the left shoulder.  He received a previous IM injection about 1 year ago at a local primary care with improvement noted.  Has recently noted the shoulder pain is returning.  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 No falls or injuries. Previous imaging at [facility].      Current Outpatient Medications:     anastrozole (ARIMIDEX) 1 MG tablet, Take 1 tablet by mouth 1 (One) Time Per Week. For 12 weeks refills in case another round is needed in the future., Disp: 12 tablet, Rfl: 0    losartan (COZAAR) 50 MG tablet, Take 1 tablet by mouth Every Night., Disp: , Rfl:     methocarbamol (ROBAXIN) 750 MG tablet, Take 1 tablet by mouth 2 (Two) Times a Day.,  "Disp: 60 tablet, Rfl: 5    naproxen (NAPROSYN) 500 MG tablet, Take 1 tablet by mouth 2 (Two) Times a Day With Meals., Disp: 90 tablet, Rfl: 3    sildenafil (REVATIO) 20 MG tablet, As directed, Disp: 60 tablet, Rfl: 10    Syringe, Disposable, 3 ML misc, Use 3 ml syringe with a 25 gauge  5/8 inch needle, Disp: 24 each, Rfl: 6    Testosterone Cypionate (Depo-Testosterone) 200 MG/ML injection, He is to use 1/2 cc every Monday and Thursday SQ, Disp: 10 mL, Rfl: 2    anastrozole (ARIMIDEX) 1 MG tablet, Take 1 tablet by mouth 1 (One) Time Per Week. For 12 weeks refills in case another round is needed in the future., Disp: 12 tablet, Rfl: 3      Allergies   Allergen Reactions    Sulfa Antibiotics      Objective     Objective   Vital Signs:  /90   Pulse 85   Temp 99 °F (37.2 °C) (Temporal)   Ht 180.3 cm (70.98\")   Wt 98.4 kg (217 lb)   SpO2 95%   BMI 30.28 kg/m²   Estimated body mass index is 30.28 kg/m² as calculated from the following:    Height as of this encounter: 180.3 cm (70.98\").    Weight as of this encounter: 98.4 kg (217 lb).    Past Medical History:   Diagnosis Date    Arthritis     Dyslipidemia     Fatigue     Hyperlipidemia     Lower back pain     Lumbar strain     Myalgia     Rib pain on right side     Rotator cuff injury 1993    bucked of horse     No past surgical history on file.    Social History     Socioeconomic History    Marital status:    Tobacco Use    Smoking status: Never    Smokeless tobacco: Current     Types: Snuff   Vaping Use    Vaping status: Never Used   Substance and Sexual Activity    Alcohol use: No    Drug use: No    Sexual activity: Defer      Physical Exam  Vitals and nursing note reviewed.   Constitutional:       General: He is not in acute distress.     Appearance: He is well-developed. He is not diaphoretic.   HENT:      Head: Normocephalic and atraumatic.   Eyes:      General: No scleral icterus.        Right eye: No discharge.         Left eye: No discharge.     "  Conjunctiva/sclera: Conjunctivae normal.   Cardiovascular:      Rate and Rhythm: Normal rate and regular rhythm.      Heart sounds: Normal heart sounds. No murmur heard.     No friction rub. No gallop.   Pulmonary:      Effort: Pulmonary effort is normal. No respiratory distress.      Breath sounds: Normal breath sounds. No wheezing or rales.   Chest:      Chest wall: No tenderness.   Musculoskeletal:         General: No swelling or tenderness. Normal range of motion.      Cervical back: Normal range of motion and neck supple.   Skin:     General: Skin is warm and dry.      Coloration: Skin is not pale.      Findings: No erythema or rash.   Neurological:      Mental Status: He is alert and oriented to person, place, and time.   Psychiatric:         Behavior: Behavior normal.        Result Review :  The following data was reviewed by: MENDEL Dillon on 05/23/2023:  Hemoglobin A1C   Date Value Ref Range Status   10/05/2023 5.50 4.80 - 5.60 % Final     TSH   Date Value Ref Range Status   10/05/2023 3.290 0.270 - 4.200 uIU/mL Final     HDL Cholesterol   Date Value Ref Range Status   10/05/2023 65 (H) 40 - 60 mg/dL Final     LDL Cholesterol    Date Value Ref Range Status   10/05/2023 84 0 - 100 mg/dL Final     Triglycerides   Date Value Ref Range Status   10/05/2023 53 0 - 150 mg/dL Final     Total Cholesterol   Date Value Ref Range Status   10/11/2016 213 (H) 0 - 200 mg/dL Final     Comment:     Cholesterol Reference Ranges:   Desirable       < 200 mg/dL   Borderline    200-239 mg/dL   High Risk       > 239 mg/dL  Triglyceride Reference Ranges:   Normal          < 150 mg/dL   Borderline    150-199 mg/dL   High          200-499 mg/dL   Very High       > 499 mg/dL  HDL Reference Ranges:   Low              < 40 mg/dL   High             > 59 mg/dL  LDL Reference Ranges:   Optimal         < 100 mg/dL   Near Optimal  100-129 mg/dL   Borderline    130-159 mg/dL   High          160-189 mg/dL   Very High       > 189  mg/dL       Lab Results   Component Value Date    BUN 17 2024     Lab Results   Component Value Date    CREATININE 1.08 2024         Assessment / Plan         Assessment   Diagnoses and all orders for this visit:    1. Primary hypertension  Recommend restarting losartan only at 50 mg nightly.  Will advance as needed/tolerated.  Limit use of NSAIDs is much as possible.   Monitor BP at home and call if BP continues to run >140/90.  Avoid sodium in the diet.     2. Biceps tendonitis   Avoiding NSAIDs and steroids at this point given BP elevation and he is currently off of his blood pressure regimen.  Restarted as noted above.  Recommend application of ice, rest, he has a brace which does help his symptoms, continue.  Apply topical voltaren, he has some at home.   [facility]   If BP improves, may consider short course of steroids/NSAIDS.       New Medications Ordered This Visit   Medications    losartan (COZAAR) 50 MG tablet     Sig: Take 1 tablet by mouth Every Night.     Health Maintenance  Referred to Dr. Gonsales for updated colorectal cancer screening, planning for this in 2024.  Consider Tdap if none completed in the last 10 years.  Consider Shingrix vaccine series.  Pneumococcal and influenza vaccines updated .    Follow Up   Return in about 3 months (around 2024) for Recheck HTN..    Patient was given instructions and counseling regarding his condition or for health maintenance advice. Please see specific information pulled into the AVS if appropriate.       This document has been electronically signed by MENDEL Dillon   2024 16:03 EDT    Dictated Utilizing Dragon Dictation: Part of this note may be an electronic transcription/translation of spoken language to printed text using the Dragon Dictation System.

## 2024-06-12 DIAGNOSIS — E28.0 ESTRADIOL EXCESS: ICD-10-CM

## 2024-06-12 RX ORDER — ANASTROZOLE 1 MG/1
TABLET ORAL
Qty: 12 TABLET | Refills: 0 | Status: SHIPPED | OUTPATIENT
Start: 2024-06-12

## 2024-09-16 NOTE — TELEPHONE ENCOUNTER
May not return to work until no fever for 24 hours and free of any associated symptoms    [FreeTextEntry1] : He needs a work up for GH deficiency  Test and refer now to capture growth time   Addendum September 15, 2024 2:46 PM.  I reviewed preliminary blood work and called mom and dad to discuss.  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  Consequently I have ordered an MRI of the pituitary and hypothalamus.  Moreover I added blood work to include estradiol prolactin and testosterone.  The parents were unable to get the bone age of the wrist but they will work on that.  Their other child goes to a [place] endocrinologist and they had a connection that got them in this coming Friday for an appointment.  The parents and I will work together to make sure that I have all resulted tests in their hand to provide to the endocrinologist.  I will be available on Friday to discuss before during or after the visit the plan.  I definitely want to hear from the family after the visit to discuss the findings. I am wondering if the pubic hair is more related to adrenarche than pubarche Caring coordination of care 55 minutes Addendum September 16 I spoke with mom and they are getting nervous and I agreed to moving the MRI to stat in advance of the endocrinology appointment on Friday to rule out a mass.  Coordinated with nurse for prior authorization and will continue to follow this added an additional 30 minutes to the original encounter  I spoke with mom, the radiologist, and 2 different contacts at the insurance company and obtaining authorization for [id number] authorization number [id number] approved through October 31, 2024 Case #[id number]  This added an additional 45 minutes, well over 2 hours in care and coordination of care  We need to identify if there is a mass that is causing his condition, we were able to get his appointment for the MRI for this Wednesday

## 2024-10-15 ENCOUNTER — OFFICE VISIT (OUTPATIENT)
Dept: UROLOGY | Facility: CLINIC | Age: 74
End: 2024-10-15
Payer: COMMERCIAL

## 2024-10-15 VITALS
HEART RATE: 75 BPM | HEIGHT: 71 IN | BODY MASS INDEX: 30.3 KG/M2 | DIASTOLIC BLOOD PRESSURE: 83 MMHG | SYSTOLIC BLOOD PRESSURE: 139 MMHG | WEIGHT: 216.4 LBS

## 2024-10-15 DIAGNOSIS — E29.1 HYPOGONADISM IN MALE: ICD-10-CM

## 2024-10-15 DIAGNOSIS — N52.9 ERECTILE DYSFUNCTION, UNSPECIFIED ERECTILE DYSFUNCTION TYPE: ICD-10-CM

## 2024-10-15 PROCEDURE — 84153 ASSAY OF PSA TOTAL: CPT | Performed by: UROLOGY

## 2024-10-15 PROCEDURE — 36415 COLL VENOUS BLD VENIPUNCTURE: CPT | Performed by: UROLOGY

## 2024-10-15 PROCEDURE — 82670 ASSAY OF TOTAL ESTRADIOL: CPT | Performed by: UROLOGY

## 2024-10-15 PROCEDURE — 99214 OFFICE O/P EST MOD 30 MIN: CPT | Performed by: UROLOGY

## 2024-10-15 PROCEDURE — 85027 COMPLETE CBC AUTOMATED: CPT | Performed by: UROLOGY

## 2024-10-15 PROCEDURE — 84403 ASSAY OF TOTAL TESTOSTERONE: CPT | Performed by: UROLOGY

## 2024-10-15 RX ORDER — TESTOSTERONE CYPIONATE 200 MG/ML
INJECTION, SOLUTION INTRAMUSCULAR
Qty: 10 ML | Refills: 2 | Status: SHIPPED | OUTPATIENT
Start: 2024-10-15

## 2024-10-15 RX ORDER — SILDENAFIL CITRATE 20 MG/1
TABLET ORAL
Qty: 60 TABLET | Refills: 10 | Status: SHIPPED | OUTPATIENT
Start: 2024-10-15

## 2024-10-15 NOTE — PROGRESS NOTES
"Chief Complaint:      Chief Complaint   Patient presents with    Low testosterone       HPI:   73 y.o. male patient returns today for follow-up.  He has been on testosterone replacement therapy.  He reports a dramatic improvement in his HARRISON questionnaire: -HARRISON-androgen deficiency in the age male questionnaire. The patient was queried regarding the androgen deficiency in the age male questionnaire.  This is a validated questionnaire that was performed on a set of 314 [place] male physicians. When it was positive it correlated directly with a 94% chance of low testosterone.  Patient indicates there is a decrease in libido or sex drive, a lack of energy, decreased  strength and endurance, a decreased \"enjoyment of life\", sad and grumpy feelings with significant difficulty maintaining erections.  There has also been a recent deterioration regarding work performance. He reports weight loss.  He has good facility and the use of subcutaneous and intramuscular injections as well as comfort level and using the medication in a sterile fashion.  He understands he should use only the prescribed dose.  He is here for appropriate lab monitoring regarding this.  He understands this is a controlled substance and therefore must be watched closely, will not be refilled in the medical loss or miscalculation of the dose.  He is very happy with the treatment and therefore wants to continue it.    Past Medical History:     Past Medical History:   Diagnosis Date    Arthritis     Dyslipidemia     Fatigue     Hyperlipidemia     Lower back pain     Lumbar strain     Myalgia     Rib pain on right side     Rotator cuff injury 1993    bucked of horse       Current Meds:     Current Outpatient Medications   Medication Sig Dispense Refill    anastrozole (ARIMIDEX) 1 MG tablet Take 1 tablet by mouth 1 (One) Time Per Week. For 12 weeks refills in case another round is needed in the future. 12 tablet 3    anastrozole (ARIMIDEX) 1 MG tablet TAKE 1 " TABLET BY MOUTH ONCE WEEKLY FOR 12 WEEKS 12 tablet 0    losartan (COZAAR) 50 MG tablet Take 1 tablet by mouth Every Night.      methocarbamol (ROBAXIN) 750 MG tablet Take 1 tablet by mouth 2 (Two) Times a Day. 60 tablet 5    naproxen (NAPROSYN) 500 MG tablet Take 1 tablet by mouth 2 (Two) Times a Day With Meals. 90 tablet 3    sildenafil (REVATIO) 20 MG tablet As directed 60 tablet 10    Syringe, Disposable, 3 ML misc Use 3 ml syringe with a 25 gauge  5/8 inch needle 24 each 6    Testosterone Cypionate (Depo-Testosterone) 200 MG/ML injection He is to use 1/2 cc every Monday and Thursday SQ 10 mL 2     No current facility-administered medications for this visit.        Allergies:      Allergies   Allergen Reactions    Sulfa Antibiotics         Past Surgical History:     No past surgical history on file.    Social History:     Social History     Socioeconomic History    Marital status:    Tobacco Use    Smoking status: Never    Smokeless tobacco: Current     Types: Snuff   Vaping Use    Vaping status: Never Used   Substance and Sexual Activity    Alcohol use: No    Drug use: No    Sexual activity: Defer       Family History:     Family History   Family history unknown: Yes       Review of Systems:     Review of Systems   Constitutional: Negative.    HENT: Negative.     Eyes: Negative.    Respiratory: Negative.     Cardiovascular: Negative.    Gastrointestinal: Negative.    Endocrine: Negative.    Musculoskeletal: Negative.    Allergic/Immunologic: Negative.    Neurological: Negative.    Hematological: Negative.    Psychiatric/Behavioral: Negative.         Physical Exam:     Physical Exam  Vitals and nursing note reviewed.   Constitutional:       Appearance: He is well-developed.   HENT:      Head: Normocephalic and atraumatic.   Eyes:      Conjunctiva/sclera: Conjunctivae normal.      Pupils: Pupils are equal, round, and reactive to light.   Cardiovascular:      Rate and Rhythm: Normal rate and regular rhythm.       Heart sounds: Normal heart sounds.   Pulmonary:      Effort: Pulmonary effort is normal.      Breath sounds: Normal breath sounds.   Abdominal:      General: Bowel sounds are normal.      Palpations: Abdomen is soft.   Musculoskeletal:         General: Normal range of motion.      Cervical back: Normal range of motion.   Skin:     General: Skin is warm and dry.   Neurological:      Mental Status: He is alert and oriented to person, place, and time.      Deep Tendon Reflexes: Reflexes are normal and symmetric.   Psychiatric:         Behavior: Behavior normal.         Thought Content: Thought content normal.         Judgment: Judgment normal.         I have reviewed the following portions of the patient's history: Allergies, current medications, past family history, past medical history, past social history, past surgical history, problem list, and ROS and confirm it is accurate.    Recent Image (CT and/or KUB):      CT Abdomen and Pelvis: No results found for this or any previous visit.       CT Stone Protocol: No results found for this or any previous visit.       KUB: No results found for this or any previous visit.       Labs (past 3 months):      No visits with results within 3 Month(s) from this visit.   Latest known visit with results is:   Office Visit on 04/04/2024   Component Date Value Ref Range Status    Testosterone, Total 04/04/2024 >1,500.00 (H)  193.00 - 740.00 ng/dL Final    Estradiol 04/04/2024 53.1  pg/mL Final    WBC 04/04/2024 6.62  3.40 - 10.80 10*3/mm3 Final    RBC 04/04/2024 5.00  4.14 - 5.80 10*6/mm3 Final    Hemoglobin 04/04/2024 14.9  13.0 - 17.7 g/dL Final    Hematocrit 04/04/2024 44.7  37.5 - 51.0 % Final    MCV 04/04/2024 89.4  79.0 - 97.0 fL Final    MCH 04/04/2024 29.8  26.6 - 33.0 pg Final    MCHC 04/04/2024 33.3  31.5 - 35.7 g/dL Final    RDW 04/04/2024 12.1 (L)  12.3 - 15.4 % Final    RDW-SD 04/04/2024 39.2  37.0 - 54.0 fl Final    MPV 04/04/2024 11.4  6.0 - 12.0 fL Final     Platelets 04/04/2024 245  140 - 450 10*3/mm3 Final    PSA 04/04/2024 0.863  0.000 - 4.000 ng/mL Final        Procedure:       Assessment/Plan:   Low testosterone: Patient is here for follow-up.  Since beginning the medication, he has been very pleased.  He reports a dramatic improvement in his erections, ability to achieve and maintain an erection, improvement in libido, increase in frequency of morning erections, and a noticeable weight loss consistent with the treatment.   He is going to have appropriate safety laboratory parameters checked.   He understands that the new data implicates testosterone with the development of prostate cancer and this is all but been disproven and the medical literature as well as the risks of cardiovascular disease which has actually also been disproven.  He understands that while he is a candidate for topical therapy if he is in contact with children this is not an option because it has been shown to accentuate genitalia development at an early age that is frequently irreversible.  He also understands this it is a controlled substance and as such will not be prescribed without appropriate follow-up and appropriate laboratory investigation.  He understands effects on spermatogenesis including the fact that this is not always completely reversible and not always completely limited his ability to father a child.  He has demonstrated facility in the technique of both intramuscular and subcutaneous injection and has been taught sterility when drawing up the medication.    Erectile dysfunction-we discussed the anatomy and physiology of the penis and the endothelium.  We discussed the various forms of erectile dysfunction including peripheral vascular occlusive disease, postoperative, secondary to radiation treatments of the prostate, and arterial inflow.  We discussed the various treatment options available including oral medication and its various forms.  We discussed the use of both  generic and non-generic Viagra.  We discussed Cialis and a longer half-life of 17 hours as well as the other 2 medications.  We discussed cost involved with this including the fact that the generic is much cheaper but is taken as multiple pills because they are 20 mg dosages.  We did discuss the other alternatives including penile injections, vacuum erection devices, and surgical intervention reserved for only the most severe cases.  We discussed the need for testosterone in about 20% of cases of erectile dysfunction.  Continue PDE-5 inhibition    PSA testing-I am recommending a PSA blood test that stands for prostate specific antigen.  I discussed the pathophysiology of PSA testing indicating its use in the diagnosis and management of prostate cancer.  I discussed the normal range being 0 to 4, but more appropriately being much closer to 0 to 2 in a normal male.  I discussed the fact that after a certain age we don't recommend PSA testing especially in view of numerous comorbidities, that this will not be a useful test.  I discussed many of the things that can artificially raise PSA including a recent infection, urinary tract infection, and recent sexual intercourse, or even the type of movement such as manipulation of the prostate from riding a bicycle.  After all this is taken into account when the test is reviewed, the most important use of PSA is the velocity measurement.  In other words, the change of PSA with time is a very important factor in the use and that we look for greater than 20% rise over a year to help us make the prediction of prostate cancer.  I also discussed that the use with prostate cancer indicating that after a radical prostatectomy, the PSA should be 0 and any rise indicates an early biochemical recurrence.    Hyperestrogenism-we spoke about the role of estrogen metabolism and breakdown in the  presence of testosterone replacement therapy.  We spoke about how high estradiol levels can  interfere with the improvement noted in a man on testosterone as well as significant side effects such as pseudogynecomastia.  We discussed the use of the medication Arimidex used in an off label setting and using a very judicious low-dose fashion to prevent too low of an estradiol which would precipitate bone complications.  Going to check an estradiol level.  He is at higher risk for breast problems due to his replacement    Polycythemia-I am going to check a CBC to rule out hemoglobin changes.  We utilized the American Heart Association guidelines for polycythemia which is a hemoglobin greater than 18 and a hematocrit greater than 54.5.  Recommend therapeutic phlebotomy as the treatment.  It is important that we indicate that is the most likely cause of the polycythemia.  We also discussed the possibility of decreasing the dose of testosterone and of stopping it altogether.    Controlled substance-he understands this is a controlled substance and as such is regulated under the state.  I cannot refill it outside the prescribed window.  I stressed the importance of follow-up and appropriate laboratory parameters monitoring.    Liver function tests-according to the AUA guidelines we will not check liver functions due to the fact this is not an oral alkylated testosterone          This document has been electronically signed by OVIDIO ESTEVEZ MD October 15, 2024 14:41 EDT    Dictated Utilizing Dragon Dictation: Part of this note may be an electronic transcription/translation of spoken language to printed text using the Dragon Dictation System.

## 2024-10-16 LAB
DEPRECATED RDW RBC AUTO: 39.4 FL (ref 37–54)
ERYTHROCYTE [DISTWIDTH] IN BLOOD BY AUTOMATED COUNT: 11.9 % (ref 12.3–15.4)
ESTRADIOL SERPL HS-MCNC: 45.4 PG/ML
HCT VFR BLD AUTO: 44.2 % (ref 37.5–51)
HGB BLD-MCNC: 14.9 G/DL (ref 13–17.7)
MCH RBC QN AUTO: 30.3 PG (ref 26.6–33)
MCHC RBC AUTO-ENTMCNC: 33.7 G/DL (ref 31.5–35.7)
MCV RBC AUTO: 89.8 FL (ref 79–97)
PLATELET # BLD AUTO: 255 10*3/MM3 (ref 140–450)
PMV BLD AUTO: 11.4 FL (ref 6–12)
PSA SERPL-MCNC: 1.01 NG/ML (ref 0–4)
RBC # BLD AUTO: 4.92 10*6/MM3 (ref 4.14–5.8)
TESTOST SERPL-MCNC: 845 NG/DL (ref 193–740)
WBC NRBC COR # BLD AUTO: 7.63 10*3/MM3 (ref 3.4–10.8)

## 2024-10-29 DIAGNOSIS — E29.1 HYPOGONADISM IN MALE: ICD-10-CM

## 2024-10-29 RX ORDER — TESTOSTERONE CYPIONATE 200 MG/ML
INJECTION, SOLUTION INTRAMUSCULAR
Qty: 4 ML | OUTPATIENT
Start: 2024-10-29

## 2024-12-23 ENCOUNTER — OFFICE VISIT (OUTPATIENT)
Dept: FAMILY MEDICINE CLINIC | Facility: CLINIC | Age: 74
End: 2024-12-23
Payer: COMMERCIAL

## 2024-12-23 VITALS
TEMPERATURE: 98.4 F | BODY MASS INDEX: 29.96 KG/M2 | DIASTOLIC BLOOD PRESSURE: 76 MMHG | OXYGEN SATURATION: 98 % | HEART RATE: 63 BPM | SYSTOLIC BLOOD PRESSURE: 130 MMHG | HEIGHT: 71 IN | WEIGHT: 214 LBS

## 2024-12-23 DIAGNOSIS — B02.29 POSTHERPETIC NEURALGIA: ICD-10-CM

## 2024-12-23 DIAGNOSIS — R10.9 FLANK PAIN: Primary | ICD-10-CM

## 2024-12-23 PROCEDURE — 99213 OFFICE O/P EST LOW 20 MIN: CPT | Performed by: PHYSICIAN ASSISTANT

## 2024-12-23 PROCEDURE — 93000 ELECTROCARDIOGRAM COMPLETE: CPT | Performed by: PHYSICIAN ASSISTANT

## 2024-12-23 RX ORDER — LIDOCAINE 50 MG/G
1 OINTMENT TOPICAL
Qty: 50 G | Refills: 2 | Status: SHIPPED | OUTPATIENT
Start: 2024-12-23

## 2024-12-23 NOTE — PROGRESS NOTES
Subjective        Chief Complaint  Abdominal Pain and Cough    Subjective      Ulices Morrissey is a 74 y.o. male who presents today to [facility] for Abdominal Pain and Cough.     Abdominal Pain and Cough:  He reports about 3 weeks ago having a viral upper respiratory infection associated with runny nose, cough, congestion.  He overall is feeling better from that standpoint.  Still having some hoarseness of his voice, but has improved.  He has recently noted some right upper quadrant/right rib cage pain.  This is a burning and tingling type pain.  This is worse with movement or twisting.  Denies any pain with cough or deep inspiration.  Denies any fever or chills.  No rash in the area, however, has a history of shingles which did affect that specific area 4 years ago.    He does still have a gallbladder.  Denies any nausea, vomiting, dyspepsia.  No symptoms with eating or drinking.      Current Outpatient Medications:     anastrozole (ARIMIDEX) 1 MG tablet, Take 1 tablet by mouth 1 (One) Time Per Week. For 12 weeks refills in case another round is needed in the future., Disp: 12 tablet, Rfl: 3    methocarbamol (ROBAXIN) 750 MG tablet, Take 1 tablet by mouth 2 (Two) Times a Day., Disp: 60 tablet, Rfl: 5    naproxen (NAPROSYN) 500 MG tablet, Take 1 tablet by mouth 2 (Two) Times a Day With Meals., Disp: 90 tablet, Rfl: 3    sildenafil (REVATIO) 20 MG tablet, As directed, Disp: 60 tablet, Rfl: 10    Syringe, Disposable, 3 ML misc, Use 3 ml syringe with a 25 gauge  5/8 inch needle, Disp: 24 each, Rfl: 6    Testosterone Cypionate (Depo-Testosterone) 200 MG/ML injection, He is to use 1/2 cc every Monday and Thursday SQ, Disp: 10 mL, Rfl: 2    anastrozole (ARIMIDEX) 1 MG tablet, TAKE 1 TABLET BY MOUTH ONCE WEEKLY FOR 12 WEEKS, Disp: 12 tablet, Rfl: 0    Diclofenac Sodium (VOLTAREN) 1 % gel gel, Apply 4 g topically to the appropriate area as directed 4 (Four) Times a Day As Needed (pain).,  "Disp: 150 g, Rfl: 1    lidocaine (XYLOCAINE) 5 % ointment, Apply 1 Application topically to the appropriate area as directed Every 2 (Two) Hours As Needed for Mild Pain., Disp: 50 g, Rfl: 2      Allergies   Allergen Reactions    Sulfa Antibiotics        Objective     Objective   Vital Signs:  /76   Pulse 63   Temp 98.4 °F (36.9 °C) (Oral)   Ht 180.3 cm (70.98\")   Wt 97.1 kg (214 lb)   SpO2 98%   BMI 29.86 kg/m²   Estimated body mass index is 29.86 kg/m² as calculated from the following:    Height as of this encounter: 180.3 cm (70.98\").    Weight as of this encounter: 97.1 kg (214 lb).    BMI is >= 25 and <30. (Overweight) The following options were offered after discussion;: weight loss educational material (shared in after visit summary)    Past Medical History:   Diagnosis Date    Arthritis     Dyslipidemia     Fatigue     Hyperlipidemia     Lower back pain     Lumbar strain     Myalgia     Rib pain on right side     Rotator cuff injury 1993    bucked of horse     History reviewed. No pertinent surgical history.  Social History     Socioeconomic History    Marital status:    Tobacco Use    Smoking status: Never    Smokeless tobacco: Current     Types: Snuff   Vaping Use    Vaping status: Never Used   Substance and Sexual Activity    Alcohol use: No    Drug use: No    Sexual activity: Defer      Physical Exam  Vitals and nursing note reviewed.   Constitutional:       General: He is not in acute distress.     Appearance: He is well-developed. He is not diaphoretic.   HENT:      Head: Normocephalic and atraumatic.   Eyes:      General: No scleral icterus.        Right eye: No discharge.         Left eye: No discharge.      Conjunctiva/sclera: Conjunctivae normal.   Cardiovascular:      Rate and Rhythm: Normal rate and regular rhythm.      Heart sounds: Normal heart sounds. No murmur heard.     No friction rub. No gallop.   Pulmonary:      Effort: Pulmonary effort is normal. No respiratory " distress.      Breath sounds: Normal breath sounds. No wheezing or rales.   Chest:      Chest wall: No tenderness.   Abdominal:      General: Bowel sounds are normal. There is no distension.      Palpations: Abdomen is soft. There is no mass.      Tenderness: There is no abdominal tenderness. There is no right CVA tenderness, left CVA tenderness, guarding or rebound.      Hernia: No hernia is present.   Musculoskeletal:         General: Normal range of motion.        Arms:       Cervical back: Normal range of motion and neck supple.      Comments: Intermittent pain reported right lower ribs. Nontender at this time.    Skin:     General: Skin is warm and dry.      Coloration: Skin is not pale.      Findings: No erythema or rash.   Neurological:      Mental Status: He is alert and oriented to person, place, and time.   Psychiatric:         Behavior: Behavior normal.        Result Review :  The following data was reviewed by: MENDEL Dillon on 12/23/2024:  Hemoglobin A1C   Date Value Ref Range Status   10/05/2023 5.50 4.80 - 5.60 % Final     TSH   Date Value Ref Range Status   10/05/2023 3.290 0.270 - 4.200 uIU/mL Final     HDL Cholesterol   Date Value Ref Range Status   10/05/2023 65 (H) 40 - 60 mg/dL Final     LDL Cholesterol    Date Value Ref Range Status   10/05/2023 84 0 - 100 mg/dL Final     Triglycerides   Date Value Ref Range Status   10/05/2023 53 0 - 150 mg/dL Final     Total Cholesterol   Date Value Ref Range Status   10/11/2016 213 (H) 0 - 200 mg/dL Final     Comment:     Cholesterol Reference Ranges:   Desirable       < 200 mg/dL   Borderline    200-239 mg/dL   High Risk       > 239 mg/dL  Triglyceride Reference Ranges:   Normal          < 150 mg/dL   Borderline    150-199 mg/dL   High          200-499 mg/dL   Very High       > 499 mg/dL  HDL Reference Ranges:   Low              < 40 mg/dL   High             > 59 mg/dL  LDL Reference Ranges:   Optimal         < 100 mg/dL   Near Optimal  100-129  mg/dL   Borderline    130-159 mg/dL   High          160-189 mg/dL   Very High       > 189 mg/dL         ECG 12 Lead    Date/Time: 12/23/2024 9:59 AM  Performed by: Cira Aaron PA    Authorized by: Cira Aaron PA  Comparison: compared with previous ECG   Similar to previous ECG  Rhythm: sinus rhythm  Rate: normal  ST Segments: ST segments normal  T Waves: T waves normal  QRS axis: normal    Clinical impression: normal ECG  Comments: Sinus rhythm at 67 bpm with no acute ST/T wave abnormalities concerning for ischemia or infarct.  QTc 383 ms.  No significant changes compared to previous EKG in January 2024.         Assessment / Plan         Assessment   Diagnoses and all orders for this visit:    1. Flank pain (Primary)  2. Postherpetic neuralgia  We discussed the most likely causes of his pain which would include recurrent postherpetic neuralgia given his recent viral illness versus costochondritis.  Given the stinging, burning pain, most likely that it is postherpetic neuralgia.  Topical lidocaine 5% ointment and topical diclofenac sent to pharmacy.  EKG obtained in the office today as the patient's family is concerned that his symptoms may be related to his heart.  Denies any chest pains or discomfort.  No shortness of breaths.  No easy fatigability.  Has been exercising regularly without any worsening pain. No concerning findings noted.  Return to clinic if no improvement noted or if symptoms are worsening.   -     ECG 12 Lead  -     lidocaine (XYLOCAINE) 5 % ointment; Apply 1 Application topically to the appropriate area as directed Every 2 (Two) Hours As Needed for Mild Pain.  Dispense: 50 g; Refill: 2  -     Diclofenac Sodium (VOLTAREN) 1 % gel gel; Apply 4 g topically to the appropriate area as directed 4 (Four) Times a Day As Needed (pain).  Dispense: 150 g; Refill: 1     New Medications Ordered This Visit   Medications    lidocaine (XYLOCAINE) 5 % ointment     Sig: Apply 1  Application topically to the appropriate area as directed Every 2 (Two) Hours As Needed for Mild Pain.     Dispense:  50 g     Refill:  2    Diclofenac Sodium (VOLTAREN) 1 % gel gel     Sig: Apply 4 g topically to the appropriate area as directed 4 (Four) Times a Day As Needed (pain).     Dispense:  150 g     Refill:  1     Follow Up   Return if symptoms worsen or fail to improve.    Patient was given instructions and counseling regarding his condition or for health maintenance advice. Please see specific information pulled into the AVS if appropriate.       This document has been electronically signed by MENDEL Dillon   December 23, 2024 10:01 EST    Dictated Utilizing Dragon Dictation: Part of this note may be an electronic transcription/translation of spoken language to printed text using the Dragon Dictation System.

## 2024-12-23 NOTE — Clinical Note
He had a colonoscopy recently with Dr. Gonsales if we can try to get the record and update his care gap

## 2025-02-24 DIAGNOSIS — E28.0 ESTRADIOL EXCESS: ICD-10-CM

## 2025-02-24 RX ORDER — ANASTROZOLE 1 MG/1
1 TABLET ORAL WEEKLY
Qty: 12 TABLET | Refills: 0 | Status: SHIPPED | OUTPATIENT
Start: 2025-02-24

## 2025-03-24 ENCOUNTER — CLINICAL SUPPORT (OUTPATIENT)
Dept: FAMILY MEDICINE CLINIC | Facility: CLINIC | Age: 75
End: 2025-03-24
Payer: COMMERCIAL

## 2025-03-24 VITALS — SYSTOLIC BLOOD PRESSURE: 120 MMHG | DIASTOLIC BLOOD PRESSURE: 70 MMHG

## 2025-03-24 NOTE — PROGRESS NOTES
Patient came into the office today to have his blood pressure checked before he went to have a physical done. Patient tolerated well.

## 2025-04-10 DIAGNOSIS — E28.0 ESTRADIOL EXCESS: ICD-10-CM

## 2025-04-10 RX ORDER — ANASTROZOLE 1 MG/1
1 TABLET ORAL WEEKLY
Qty: 12 TABLET | Refills: 0 | Status: SHIPPED | OUTPATIENT
Start: 2025-04-10

## 2025-04-15 ENCOUNTER — OFFICE VISIT (OUTPATIENT)
Dept: UROLOGY | Facility: CLINIC | Age: 75
End: 2025-04-15
Payer: COMMERCIAL

## 2025-04-15 VITALS
DIASTOLIC BLOOD PRESSURE: 78 MMHG | BODY MASS INDEX: 30.49 KG/M2 | SYSTOLIC BLOOD PRESSURE: 136 MMHG | HEART RATE: 64 BPM | HEIGHT: 71 IN | WEIGHT: 217.8 LBS

## 2025-04-15 DIAGNOSIS — N52.9 ERECTILE DYSFUNCTION, UNSPECIFIED ERECTILE DYSFUNCTION TYPE: ICD-10-CM

## 2025-04-15 DIAGNOSIS — E29.1 HYPOGONADISM IN MALE: ICD-10-CM

## 2025-04-15 DIAGNOSIS — N42.9 DISORDER OF PROSTATE: Primary | ICD-10-CM

## 2025-04-15 DIAGNOSIS — R79.89 LOW TESTOSTERONE: ICD-10-CM

## 2025-04-15 PROCEDURE — 84403 ASSAY OF TOTAL TESTOSTERONE: CPT | Performed by: UROLOGY

## 2025-04-15 PROCEDURE — 85027 COMPLETE CBC AUTOMATED: CPT | Performed by: UROLOGY

## 2025-04-15 PROCEDURE — 36415 COLL VENOUS BLD VENIPUNCTURE: CPT | Performed by: UROLOGY

## 2025-04-15 PROCEDURE — 84153 ASSAY OF PSA TOTAL: CPT | Performed by: UROLOGY

## 2025-04-15 PROCEDURE — 99214 OFFICE O/P EST MOD 30 MIN: CPT | Performed by: UROLOGY

## 2025-04-15 PROCEDURE — 82670 ASSAY OF TOTAL ESTRADIOL: CPT | Performed by: UROLOGY

## 2025-04-15 RX ORDER — TESTOSTERONE CYPIONATE 200 MG/ML
INJECTION, SOLUTION INTRAMUSCULAR
Qty: 10 ML | Refills: 2 | Status: SHIPPED | OUTPATIENT
Start: 2025-04-15

## 2025-04-15 RX ORDER — SILDENAFIL CITRATE 20 MG/1
TABLET ORAL
Qty: 60 TABLET | Refills: 10 | Status: SHIPPED | OUTPATIENT
Start: 2025-04-15

## 2025-04-15 NOTE — PROGRESS NOTES
"Chief Complaint:      Chief Complaint   Patient presents with    Low testosterone       HPI:   74 y.o. male patient returns today for follow-up.  He has been on testosterone replacement therapy.  He reports a dramatic improvement in his HARRISON questionnaire: -HARRISON-androgen deficiency in the age male questionnaire. The patient was queried regarding the androgen deficiency in the age male questionnaire.  This is a validated questionnaire that was performed on a set of 314 [place] male physicians. When it was positive it correlated directly with a 94% chance of low testosterone.  Patient indicates there is a decrease in libido or sex drive, a lack of energy, decreased  strength and endurance, a decreased \"enjoyment of life\", sad and grumpy feelings with significant difficulty maintaining erections.  There has also been a recent deterioration regarding work performance. He reports weight loss.  He has good facility and the use of subcutaneous and intramuscular injections as well as comfort level and using the medication in a sterile fashion.  He understands he should use only the prescribed dose.  He is here for appropriate lab monitoring regarding this.  He understands this is a controlled substance and therefore must be watched closely, will not be refilled in the medical loss or miscalculation of the dose.  He is very happy with the treatment and therefore wants to continue it.    Past Medical History:     Past Medical History:   Diagnosis Date    Arthritis     Dyslipidemia     Fatigue     Hyperlipidemia     Lower back pain     Lumbar strain     Myalgia     Rib pain on right side     Rotator cuff injury 1993    bucked of horse       Current Meds:     Current Outpatient Medications   Medication Sig Dispense Refill    anastrozole (ARIMIDEX) 1 MG tablet Take 1 tablet by mouth 1 (One) Time Per Week. For 12 weeks refills in case another round is needed in the future. 12 tablet 3    anastrozole (ARIMIDEX) 1 MG tablet TAKE 1 " TABLET BY MOUTH ONCE WEEKLY 12 tablet 0    Diclofenac Sodium (VOLTAREN) 1 % gel gel Apply 4 g topically to the appropriate area as directed 4 (Four) Times a Day As Needed (pain). 150 g 1    lidocaine (XYLOCAINE) 5 % ointment Apply 1 Application topically to the appropriate area as directed Every 2 (Two) Hours As Needed for Mild Pain. 50 g 2    methocarbamol (ROBAXIN) 750 MG tablet Take 1 tablet by mouth 2 (Two) Times a Day. 60 tablet 5    naproxen (NAPROSYN) 500 MG tablet Take 1 tablet by mouth 2 (Two) Times a Day With Meals. 90 tablet 3    sildenafil (REVATIO) 20 MG tablet As directed 60 tablet 10    Syringe, Disposable, 3 ML misc Use 3 ml syringe with a 25 gauge  5/8 inch needle 24 each 6    Testosterone Cypionate (Depo-Testosterone) 200 MG/ML injection He is to use 1/2 cc every Monday and Thursday SQ 10 mL 2     No current facility-administered medications for this visit.        Allergies:      Allergies   Allergen Reactions    Sulfa Antibiotics         Past Surgical History:     History reviewed. No pertinent surgical history.    Social History:     Social History     Socioeconomic History    Marital status:    Tobacco Use    Smoking status: Never    Smokeless tobacco: Current     Types: Snuff   Vaping Use    Vaping status: Never Used   Substance and Sexual Activity    Alcohol use: No    Drug use: No    Sexual activity: Defer       Family History:     Family History   Family history unknown: Yes       Review of Systems:     Review of Systems   Constitutional: Negative.    HENT: Negative.     Eyes: Negative.    Respiratory: Negative.     Cardiovascular: Negative.    Gastrointestinal: Negative.    Endocrine: Negative.    Musculoskeletal: Negative.    Allergic/Immunologic: Negative.    Neurological: Negative.    Hematological: Negative.    Psychiatric/Behavioral: Negative.         Physical Exam:     Physical Exam  Vitals and nursing note reviewed.   Constitutional:       Appearance: He is well-developed.    HENT:      Head: Normocephalic and atraumatic.   Eyes:      Conjunctiva/sclera: Conjunctivae normal.      Pupils: Pupils are equal, round, and reactive to light.   Cardiovascular:      Rate and Rhythm: Normal rate and regular rhythm.      Heart sounds: Normal heart sounds.   Pulmonary:      Effort: Pulmonary effort is normal.      Breath sounds: Normal breath sounds.   Abdominal:      General: Bowel sounds are normal.      Palpations: Abdomen is soft.   Musculoskeletal:         General: Normal range of motion.      Cervical back: Normal range of motion.   Skin:     General: Skin is warm and dry.   Neurological:      Mental Status: He is alert and oriented to person, place, and time.      Deep Tendon Reflexes: Reflexes are normal and symmetric.   Psychiatric:         Behavior: Behavior normal.         Thought Content: Thought content normal.         Judgment: Judgment normal.         I have reviewed the following portions of the patient's history: Allergies, current medications, past family history, past medical history, past social history, past surgical history, problem list, and ROS and confirm it is accurate.    Recent Image (CT and/or KUB):      CT Abdomen and Pelvis: No results found for this or any previous visit.       CT Stone Protocol: No results found for this or any previous visit.       KUB: No results found for this or any previous visit.       Labs (past 3 months):      No visits with results within 3 Month(s) from this visit.   Latest known visit with results is:   Office Visit on 10/15/2024   Component Date Value Ref Range Status    PSA 10/15/2024 1.010  0.000 - 4.000 ng/mL Final    Testosterone, Total 10/15/2024 845.00 (H)  193.00 - 740.00 ng/dL Final    Estradiol 10/15/2024 45.4  pg/mL Final    WBC 10/15/2024 7.63  3.40 - 10.80 10*3/mm3 Final    RBC 10/15/2024 4.92  4.14 - 5.80 10*6/mm3 Final    Hemoglobin 10/15/2024 14.9  13.0 - 17.7 g/dL Final    Hematocrit 10/15/2024 44.2  37.5 - 51.0 % Final     MCV 10/15/2024 89.8  79.0 - 97.0 fL Final    MCH 10/15/2024 30.3  26.6 - 33.0 pg Final    MCHC 10/15/2024 33.7  31.5 - 35.7 g/dL Final    RDW 10/15/2024 11.9 (L)  12.3 - 15.4 % Final    RDW-SD 10/15/2024 39.4  37.0 - 54.0 fl Final    MPV 10/15/2024 11.4  6.0 - 12.0 fL Final    Platelets 10/15/2024 255  140 - 450 10*3/mm3 Final        Procedure:       Assessment/Plan:   Low testosterone: Patient is here for follow-up.  Since beginning the medication, he has been very pleased.  He reports a dramatic improvement in his erections, ability to achieve and maintain an erection, improvement in libido, increase in frequency of morning erections, and a noticeable weight loss consistent with the treatment.   He is going to have appropriate safety laboratory parameters checked.   He understands that the new data implicates testosterone with the development of prostate cancer and this is all but been disproven and the medical literature as well as the risks of cardiovascular disease which has actually also been disproven.  He understands that while he is a candidate for topical therapy if he is in contact with children this is not an option because it has been shown to accentuate genitalia development at an early age that is frequently irreversible.  He also understands this it is a controlled substance and as such will not be prescribed without appropriate follow-up and appropriate laboratory investigation.  He understands effects on spermatogenesis including the fact that this is not always completely reversible and not always completely limited his ability to father a child.  He has demonstrated facility in the technique of both intramuscular and subcutaneous injection and has been taught sterility when drawing up the medication.    Erectile dysfunction-we discussed the anatomy and physiology of the penis and the endothelium.  We discussed the various forms of erectile dysfunction including peripheral vascular occlusive  disease, postoperative, secondary to radiation treatments of the prostate, and arterial inflow.  We discussed the various treatment options available including oral medication and its various forms.  We discussed the use of both generic and non-generic Viagra.  We discussed Cialis and a longer half-life of 17 hours as well as the other 2 medications.  We discussed cost involved with this including the fact that the generic is much cheaper but is taken as multiple pills because they are 20 mg dosages.  We did discuss the other alternatives including penile injections, vacuum erection devices, and surgical intervention reserved for only the most severe cases.  We discussed the need for testosterone in about 20% of cases of erectile dysfunction.  Continue PDE-5 inhibition    PSA testing-I am recommending a PSA blood test that stands for prostate specific antigen.  I discussed the pathophysiology of PSA testing indicating its use in the diagnosis and management of prostate cancer.  I discussed the normal range being 0 to 4, but more appropriately being much closer to 0 to 2 in a normal male.  I discussed the fact that after a certain age we don't recommend PSA testing especially in view of numerous comorbidities, that this will not be a useful test.  I discussed many of the things that can artificially raise PSA including a recent infection, urinary tract infection, and recent sexual intercourse, or even the type of movement such as manipulation of the prostate from riding a bicycle.  After all this is taken into account when the test is reviewed, the most important use of PSA is the velocity measurement.  In other words, the change of PSA with time is a very important factor in the use and that we look for greater than 20% rise over a year to help us make the prediction of prostate cancer.  I also discussed that the use with prostate cancer indicating that after a radical prostatectomy, the PSA should be 0 and any rise  indicates an early biochemical recurrence.    Hyperestrogenism-we spoke about the role of estrogen metabolism and breakdown in the  presence of testosterone replacement therapy.  We spoke about how high estradiol levels can interfere with the improvement noted in a man on testosterone as well as significant side effects such as pseudogynecomastia.  We discussed the use of the medication Arimidex used in an off label setting and using a very judicious low-dose fashion to prevent too low of an estradiol which would precipitate bone complications.  Going to check an estradiol level.  He is at higher risk for breast problems due to his replacement    Polycythemia-I am going to check a CBC to rule out hemoglobin changes.  We utilized the American Heart Association guidelines for polycythemia which is a hemoglobin greater than 18 and a hematocrit greater than 54.5.  Recommend therapeutic phlebotomy as the treatment.  It is important that we indicate that is the most likely cause of the polycythemia.  We also discussed the possibility of decreasing the dose of testosterone and of stopping it altogether. We also discussed the concomitant diagnosis of sleep apnea in patients with polycythemia in the range of 50% and spoke about sleep studies.    Controlled substance-he understands this is a controlled substance and as such is regulated under the state.  I cannot refill it outside the prescribed window.  I stressed the importance of follow-up and appropriate laboratory parameters monitoring.    Liver function tests-according to the AUA guidelines we will not check liver functions due to the fact this is not an oral alkylated testosterone          This document has been electronically signed by OVIDIO ESTEVEZ MD April 15, 2025 14:23 EDT    Dictated Utilizing Dragon Dictation: Part of this note may be an electronic transcription/translation of spoken language to printed text using the Dragon Dictation System.

## 2025-04-16 LAB
DEPRECATED RDW RBC AUTO: 37.7 FL (ref 37–54)
ERYTHROCYTE [DISTWIDTH] IN BLOOD BY AUTOMATED COUNT: 11.5 % (ref 12.3–15.4)
ESTRADIOL SERPL HS-MCNC: 63.2 PG/ML
HCT VFR BLD AUTO: 43.1 % (ref 37.5–51)
HGB BLD-MCNC: 14.6 G/DL (ref 13–17.7)
MCH RBC QN AUTO: 30.6 PG (ref 26.6–33)
MCHC RBC AUTO-ENTMCNC: 33.9 G/DL (ref 31.5–35.7)
MCV RBC AUTO: 90.4 FL (ref 79–97)
PLATELET # BLD AUTO: 232 10*3/MM3 (ref 140–450)
PMV BLD AUTO: 11.7 FL (ref 6–12)
PSA SERPL-MCNC: 0.64 NG/ML (ref 0–4)
RBC # BLD AUTO: 4.77 10*6/MM3 (ref 4.14–5.8)
TESTOST SERPL-MCNC: 1306 NG/DL (ref 193–740)
WBC NRBC COR # BLD AUTO: 8.53 10*3/MM3 (ref 3.4–10.8)

## 2025-04-23 DIAGNOSIS — N52.9 ERECTILE DYSFUNCTION, UNSPECIFIED ERECTILE DYSFUNCTION TYPE: ICD-10-CM

## 2025-04-23 RX ORDER — SILDENAFIL CITRATE 20 MG/1
TABLET ORAL
Qty: 60 TABLET | Refills: 10 | Status: SHIPPED | OUTPATIENT
Start: 2025-04-23

## 2025-08-07 DIAGNOSIS — E28.0 ESTRADIOL EXCESS: ICD-10-CM

## 2025-08-07 RX ORDER — ANASTROZOLE 1 MG/1
1 TABLET ORAL WEEKLY
Qty: 12 TABLET | Refills: 0 | Status: SHIPPED | OUTPATIENT
Start: 2025-08-07